# Patient Record
Sex: FEMALE | URBAN - METROPOLITAN AREA
[De-identification: names, ages, dates, MRNs, and addresses within clinical notes are randomized per-mention and may not be internally consistent; named-entity substitution may affect disease eponyms.]

---

## 2020-10-20 ENCOUNTER — APPOINTMENT (OUTPATIENT)
Dept: URBAN - METROPOLITAN AREA CLINIC 273 | Age: 75
Setting detail: DERMATOLOGY
End: 2020-10-20

## 2020-10-20 DIAGNOSIS — L30.9 DERMATITIS, UNSPECIFIED: ICD-10-CM

## 2020-10-20 DIAGNOSIS — L57.0 ACTINIC KERATOSIS: ICD-10-CM

## 2020-10-20 DIAGNOSIS — D485 NEOPLASM OF UNCERTAIN BEHAVIOR OF SKIN: ICD-10-CM

## 2020-10-20 PROBLEM — D48.5 NEOPLASM OF UNCERTAIN BEHAVIOR OF SKIN: Status: ACTIVE | Noted: 2020-10-20

## 2020-10-20 PROCEDURE — OTHER BIOPSY BY SHAVE METHOD: OTHER

## 2020-10-20 PROCEDURE — 99214 OFFICE O/P EST MOD 30 MIN: CPT | Mod: 25

## 2020-10-20 PROCEDURE — OTHER COUNSELING: OTHER

## 2020-10-20 PROCEDURE — 17000 DESTRUCT PREMALG LESION: CPT | Mod: 59

## 2020-10-20 PROCEDURE — OTHER PRESCRIPTION MEDICATION MANAGEMENT: OTHER

## 2020-10-20 PROCEDURE — OTHER SEPARATE AND IDENTIFIABLE DOCUMENTATION: OTHER

## 2020-10-20 PROCEDURE — OTHER LIQUID NITROGEN: OTHER

## 2020-10-20 PROCEDURE — OTHER REASSURANCE: OTHER

## 2020-10-20 PROCEDURE — 11102 TANGNTL BX SKIN SINGLE LES: CPT

## 2020-10-20 PROCEDURE — OTHER PRESCRIPTION: OTHER

## 2020-10-20 RX ORDER — MUPIROCIN 20 MG/G
OINTMENT TOPICAL
Qty: 1 | Refills: 1 | Status: ERX | COMMUNITY
Start: 2020-10-20

## 2020-10-20 RX ORDER — HYDROCORTISONE 25 MG/G
CREAM TOPICAL
Qty: 1 | Refills: 2 | Status: ERX | COMMUNITY
Start: 2020-10-20

## 2020-10-20 ASSESSMENT — LOCATION ZONE DERM
LOCATION ZONE: TRUNK
LOCATION ZONE: HAND
LOCATION ZONE: TRUNK
LOCATION ZONE: EAR

## 2020-10-20 ASSESSMENT — LOCATION DETAILED DESCRIPTION DERM
LOCATION DETAILED: LEFT LATERAL SUPERIOR CHEST
LOCATION DETAILED: LEFT LATERAL SUPERIOR CHEST
LOCATION DETAILED: LEFT ULNAR DORSAL HAND
LOCATION DETAILED: RIGHT ANTERIOR EARLOBE

## 2020-10-20 ASSESSMENT — LOCATION SIMPLE DESCRIPTION DERM
LOCATION SIMPLE: CHEST
LOCATION SIMPLE: CHEST
LOCATION SIMPLE: RIGHT EAR
LOCATION SIMPLE: LEFT HAND

## 2020-10-20 NOTE — PROCEDURE: BIOPSY BY SHAVE METHOD
Consent: Verbal consent was obtained and risks were reviewed including but not limited to scarring, infection, bleeding, scabbing, incomplete removal, nerve damage and allergy to anesthesia.
Information: Selecting Yes will display possible errors in your note based on the variables you have selected. This validation is only offered as a suggestion for you. PLEASE NOTE THAT THE VALIDATION TEXT WILL BE REMOVED WHEN YOU FINALIZE YOUR NOTE. IF YOU WANT TO FAX A PRELIMINARY NOTE YOU WILL NEED TO TOGGLE THIS TO 'NO' IF YOU DO NOT WANT IT IN YOUR FAXED NOTE.
Biopsy Type: H and E
Dressing: bandage
Curettage Text: The wound bed was treated with curettage after the biopsy was performed.
Validate Anticipated Plan: No
Post-Care Instructions: I reviewed with the patient in detail post-care instructions. Patient is to keep the biopsy site dry overnight, remove the bandage with the next shower/bath, clean with mild soap and water and apply a thin coat of Vaseline/Aquaphor. The patient may cover with a light bandage as needed. The patient was instructed to call the office should any bleeding occur that cannot be stopped with firm pressure for 20 minutes.
Cryotherapy Text: The wound bed was treated with cryotherapy after the biopsy was performed.
Notification Instructions: Patient will be notified of biopsy results. However, patient instructed to call the office if not contacted within 2 weeks.
Size Of Lesion In Cm: 0.4
Billing Type: Third-Party Bill
Anesthesia Type: 1% lidocaine with epinephrine
Electrodesiccation Text: The wound bed was treated with electrodesiccation after the biopsy was performed.
Path Notes (To The Dermatopathologist): Check margins
X Size Of Lesion In Cm: 0
Electrodesiccation And Curettage Text: The wound bed was treated with electrodesiccation and curettage after the biopsy was performed.
Wound Care: Petrolatum
Type Of Destruction Used: Curettage
Depth Of Biopsy: dermis
Render Post-Care Instructions In Note?: yes
Anesthesia Volume In Cc (Will Not Render If 0): 0.5
Biopsy Method: Dermablade
Hemostasis: Drysol
Silver Nitrate Text: The wound bed was treated with silver nitrate after the biopsy was performed.
Detail Level: Detailed

## 2020-10-20 NOTE — PROCEDURE: PRESCRIPTION MEDICATION MANAGEMENT
Initiate Treatment: Mupirocin ointment 2% and hydrocortisone 2.5%
Render In Strict Bullet Format?: No
Detail Level: Zone

## 2020-12-02 ENCOUNTER — APPOINTMENT (OUTPATIENT)
Dept: URBAN - METROPOLITAN AREA CLINIC 273 | Age: 75
Setting detail: DERMATOLOGY
End: 2020-12-07

## 2020-12-02 ENCOUNTER — RX ONLY (RX ONLY)
Age: 75
End: 2020-12-02

## 2020-12-02 PROBLEM — C44.629 SQUAMOUS CELL CARCINOMA OF SKIN OF LEFT UPPER LIMB, INCLUDING SHOULDER: Status: ACTIVE | Noted: 2020-12-02

## 2020-12-02 PROCEDURE — 17311 MOHS 1 STAGE H/N/HF/G: CPT

## 2020-12-02 PROCEDURE — 12042 INTMD RPR N-HF/GENIT2.6-7.5: CPT

## 2020-12-02 PROCEDURE — OTHER MOHS SURGERY: OTHER

## 2020-12-02 RX ORDER — CEPHALEXIN 500 MG/1
CAPSULE ORAL
Qty: 21 | Refills: 0 | Status: ERX | COMMUNITY
Start: 2020-12-02

## 2020-12-16 ENCOUNTER — APPOINTMENT (OUTPATIENT)
Dept: URBAN - METROPOLITAN AREA CLINIC 273 | Age: 75
Setting detail: DERMATOLOGY
End: 2020-12-16

## 2020-12-16 DIAGNOSIS — Z48.02 ENCOUNTER FOR REMOVAL OF SUTURES: ICD-10-CM

## 2020-12-16 PROCEDURE — OTHER ADDITIONAL NOTES: OTHER

## 2020-12-16 PROCEDURE — OTHER SUTURE REMOVAL (GLOBAL PERIOD): OTHER

## 2020-12-16 PROCEDURE — OTHER COUNSELING: OTHER

## 2020-12-16 ASSESSMENT — LOCATION SIMPLE DESCRIPTION DERM: LOCATION SIMPLE: LEFT HAND

## 2020-12-16 ASSESSMENT — LOCATION ZONE DERM: LOCATION ZONE: HAND

## 2020-12-16 ASSESSMENT — LOCATION DETAILED DESCRIPTION DERM: LOCATION DETAILED: LEFT RADIAL DORSAL HAND

## 2020-12-16 NOTE — PROCEDURE: ADDITIONAL NOTES
Additional Notes: Provider recommended topical abx, Mupuricin ointment, pt had some at home. Informed her to apply BiD
Detail Level: Simple

## 2020-12-16 NOTE — PROCEDURE: SUTURE REMOVAL (GLOBAL PERIOD)
Detail Level: Detailed
Add 21932 Cpt? (Important Note: In 2017 The Use Of 58340 Is Being Tracked By Cms To Determine Future Global Period Reimbursement For Global Periods): no

## 2020-12-28 ENCOUNTER — APPOINTMENT (OUTPATIENT)
Dept: URBAN - METROPOLITAN AREA CLINIC 273 | Age: 75
Setting detail: DERMATOLOGY
End: 2020-12-28

## 2020-12-28 DIAGNOSIS — D485 NEOPLASM OF UNCERTAIN BEHAVIOR OF SKIN: ICD-10-CM

## 2020-12-28 DIAGNOSIS — Z85.828 PERSONAL HISTORY OF OTHER MALIGNANT NEOPLASM OF SKIN: ICD-10-CM

## 2020-12-28 PROBLEM — D48.5 NEOPLASM OF UNCERTAIN BEHAVIOR OF SKIN: Status: ACTIVE | Noted: 2020-12-28

## 2020-12-28 PROCEDURE — OTHER COUNSELING: OTHER

## 2020-12-28 PROCEDURE — 11102 TANGNTL BX SKIN SINGLE LES: CPT

## 2020-12-28 PROCEDURE — OTHER BIOPSY BY SHAVE METHOD: OTHER

## 2020-12-28 PROCEDURE — 99213 OFFICE O/P EST LOW 20 MIN: CPT | Mod: 25

## 2020-12-28 ASSESSMENT — LOCATION DETAILED DESCRIPTION DERM: LOCATION DETAILED: LEFT PROXIMAL PRETIBIAL REGION

## 2020-12-28 ASSESSMENT — LOCATION ZONE DERM: LOCATION ZONE: LEG

## 2020-12-28 ASSESSMENT — LOCATION SIMPLE DESCRIPTION DERM: LOCATION SIMPLE: LEFT PRETIBIAL REGION

## 2020-12-28 NOTE — PROCEDURE: BIOPSY BY SHAVE METHOD
Type Of Destruction Used: Curettage
Was A Bandage Applied: Yes
X Size Of Lesion In Cm: 0
Wound Care: No ointment
Cryotherapy Text: The wound bed was treated with cryotherapy after the biopsy was performed.
Billing Type: Third-Party Bill
Validate That Anesthesia Is Not 0: No
Silver Nitrate Text: The wound bed was treated with silver nitrate after the biopsy was performed.
Electrodesiccation Text: The wound bed was treated with electrodesiccation after the biopsy was performed.
Consent: Written consent was obtained and risks were reviewed including but not limited to scarring, infection, bleeding, scabbing, incomplete removal, nerve damage and allergy to anesthesia.
Biopsy Method: Dermablade
Post-Care Instructions: I reviewed with the patient in detail post-care instructions. Patient is to keep the biopsy site dry overnight, and then apply bacitracin twice daily until healed. Patient may apply hydrogen peroxide soaks to remove any crusting.
Biopsy Type: H and E
Electrodesiccation And Curettage Text: The wound bed was treated with electrodesiccation and curettage after the biopsy was performed.
Notification Instructions: Patient will be notified of biopsy results. However, patient instructed to call the office if not contacted within 2 weeks.
Anesthesia Volume In Cc (Will Not Render If 0): 1
Depth Of Biopsy: dermis
Hemostasis: Aluminum Chloride
Anesthesia Type: 1% lidocaine with epinephrine
Detail Level: Simple
Curettage Text: The wound bed was treated with curettage after the biopsy was performed.
Dressing: bandage
Information: Selecting Yes will display possible errors in your note based on the variables you have selected. This validation is only offered as a suggestion for you. PLEASE NOTE THAT THE VALIDATION TEXT WILL BE REMOVED WHEN YOU FINALIZE YOUR NOTE. IF YOU WANT TO FAX A PRELIMINARY NOTE YOU WILL NEED TO TOGGLE THIS TO 'NO' IF YOU DO NOT WANT IT IN YOUR FAXED NOTE.

## 2021-01-13 ENCOUNTER — APPOINTMENT (OUTPATIENT)
Dept: URBAN - METROPOLITAN AREA CLINIC 273 | Age: 76
Setting detail: DERMATOLOGY
End: 2021-01-13

## 2021-01-13 PROBLEM — C44.729 SQUAMOUS CELL CARCINOMA OF SKIN OF LEFT LOWER LIMB, INCLUDING HIP: Status: ACTIVE | Noted: 2021-01-13

## 2021-01-13 PROCEDURE — OTHER SUPERFICIAL RADIATION TREATMENT: OTHER

## 2021-01-13 PROCEDURE — 77300 RADIATION THERAPY DOSE PLAN: CPT

## 2021-01-13 PROCEDURE — 77334 RADIATION TREATMENT AID(S): CPT

## 2021-01-13 PROCEDURE — OTHER TREATMENT REGIMEN: OTHER

## 2021-01-13 PROCEDURE — 99213 OFFICE O/P EST LOW 20 MIN: CPT | Mod: 25

## 2021-01-13 PROCEDURE — G6001 ECHO GUIDANCE RADIOTHERAPY: HCPCS

## 2021-01-13 PROCEDURE — 77280 THER RAD SIMULAJ FIELD SMPL: CPT

## 2021-01-13 PROCEDURE — 77261 THER RADIOLOGY TX PLNG SMPL: CPT

## 2021-01-13 NOTE — PROCEDURE: SUPERFICIAL RADIATION TREATMENT
Treatment Margins In Cm: 0.8
Treatment Time / Fractionation (Optional- Include Units): 0.33min
Bill For Radiation Treatment: No
Fractions / Week Rx 3: 5
Bill For Simulation And Treatment Device Design: Yes
Treatment Device Design After Initial Simulation Justification (Will Render If Bill For Treatment Devices = Yes): The patient is status post radiation simulation and is evaluated as to the use of additional devices for shielding and placement for radiation therapy.
Intro Statement (Will Not Render If Left Blank): The patient is undergoing superficial radiation therapy for skin cancer and presents for weekly evaluation and management.  Per protocol and as documented on the flow sheet, the patient was questioned as to subjective redness, pruritus, pain, drainage, fatigue, or any other symptoms.  Objectively, the radiation area was evaluated with regards to erythema, atrophy, scale, crusting, erosion, ulceration, edema, purpura, tenderness, warmth, drainage, and any other findings.  The plan was extensively reviewed including the dose, and dosing schedule.  The simulation and clinical setup was also reviewed as was the external and any internal shields and based on this review the appropriateness and sufficiency of treatment was determined.
Pathology Override (Pathology Will Render As Diagnosis Name If Left Blank): Squamous Cell Carcinoma-Invasive
Energy (Optional-Please Include Units): 70kV
Computed Treatment Time In Min (Will Render The Same As Calculated Treatment Time If Left Blank): 0.33
Field Size (Applicator): 4.0 cm
Information: Selecting Yes will display possible errors in your note based on the variables you have selected. This validation is only offered as a suggestion for you. PLEASE NOTE THAT THE VALIDATION TEXT WILL BE REMOVED WHEN YOU FINALIZE YOUR NOTE. IF YOU WANT TO FAX A PRELIMINARY NOTE YOU WILL NEED TO TOGGLE THIS TO 'NO' IF YOU DO NOT WANT IT IN YOUR FAXED NOTE.
Port Dimensions-X Axis In Cm: 3
Functional Status: 0 (fully active)
Additional Prescription Justification Text: If there is any interruption in treatment exceeding 5 days please see Decay and Dose Adjustment Calculation and complete treatment under Prescription 2, 3 or 4 as appropriate.
Detail Level: Detailed
Shielding Size (Optional- Include Units): 3.0x3.0
Field Number: 1
Dose Per Fractionation In Cgy (Optional): 230.01cGy
Total Number Of Fractions Rx 3: 15
Custom Shielding Preamble Text Will Not Be Included With Simple Simulations (.......... X X Y Cm): A lead shield of 0.762 mm thickness is utilized to form a molded, custom shield with a
Limb Positioning Or Elevation: Legs Extended
Dimensions-X Axis In Cm: 1.5
Simple Simulation Preamble Text Will Be Included With Simple Simulations (.......... Indications): Simple simulation was performed today for the following reasons:
Assessment: Appropriate reaction
Patient Positioning: Supine
Time Dose Fractionation (Optional- Include Units If Applicable): 93
Custom Shielding Afterword Text Will Not Be Included With Simple Simulations (X X Y Cm............): port to correlate with the lesion size, including treatment margin. The custom lead shield is adequate to accommodate the appropriate applicator and provide adequate shielding around the treatment site. Additional shielding (as noted below) is used to protect sensitive, normal tissues.
Total Number Of Fractions: 25
Treatment Time In Min (Optional): 1.06
Fractionation Number: 0
Depth (Optional-Please Include Units): 1.98
Energy (Include Units): 100kV
Simple Simulation Afterword Text Will Be Included With Simple Simulations (Indications............): The patient had a complete consultation regarding all applicable modalities for the treatment of their lesion and based on a variety of factors including the type of lesion, size, and location, the relevant medical history as well as local tissue factors, the functional status of the individual, the ability to perform necessary postoperative wound instructions and the need for simultaneous treatments as well as overall wound healing status, it was determined that the patient would begin radiation therapy treatment for skin cancer.  A full simulation and treatment device design was performed including the determination and formulation of appropriate simple and complex devices including lead shield of 2.286 mm thickness to form molded customized shielding to specifically correlate with the lesion size including treatment margin.  The custom lead shield is adequate to accommodate the appropriate applicator and provide adequate shielding around the treatment site.  The specific field applicator, shields, and devices both simple and complex as well as the specific patient setup is outlined below.  The patient was given a full consent for superficial radiation to both verbally and in writing and the full determination of patient's eligibility for treatment and selection is outlined on the patient eligibility and treatment selection form.  The specific superficial radiotherapy prescription was determined and was documented on the superficial radiotherapy prescription form.  A treatment calculation was also performed and documented on the treatment calculation form.  Based on the prescription, the patient was scheduled for a series of fractional treatments.
Initial Radiation Treatment Planning (Will Render If Bill Simulation = Yes): The patient had a complete consultation regarding all applicable modalities for the treatment of their skin cancer and based on a variety of factors including the type of lesion, size, and location, the relevant medical history as well as local tissue factors, the functional status of the individual, the ability to perform necessary postoperative wound instructions and the need for simultaneous treatments as well as overall wound healing status, it was determined that the patient would begin radiation therapy treatment for skin cancer.  A full simulation and treatment device design was performed including the determination and formulation of appropriate simple and complex devices including lead shield of 0.762 mm thickness to form molded customized shielding to specifically correlate with the lesion size including treatment margin.  The custom lead shield is adequate to accommodate the appropriate applicator and provide adequate shielding around the treatment site.  The specific field applicator, shields, and devices both simple and complex as well as the specific patient setup is outlined below.  The patient was given a full consent for superficial radiation to both verbally and in writing and the full determination of patient's eligibility for treatment and selection is outlined on the patient eligibility and treatment selection form.  The specific superficial radiotherapy prescription was determined and was documented on the superficial radiotherapy prescription form.  A treatment calculation was also performed and documented on the treatment calculation form.  Based on the prescription, the patient was scheduled for a series of fractional treatments.
Dose / Tx In Cgy (Optional): 596.78
Total Dose (Optional-Please Include Units): 5750.25cGy

## 2021-01-13 NOTE — PROCEDURE: TREATMENT REGIMEN
Plan: Per the request of Dr. Kristopher Finnegan, patient was seen today for Superficial Radiation Therapy requiring simulation in preparation for treatment of specific diseased site(s). Simulation is necessary to determine correct patient and treatment portal positioning, deliver safe and effective radiation therapy. \\n\\nA high frequency ultrasound image was acquired today for three-dimensional evaluation of tumor volume and response to treatment, in addition to geometric accuracy of field placement (CPT® ). Physician evaluation of the ultrasound tumor depth will be ongoing through the course of treatment and is deemed medically necessary ensuring efficacy of treatment. Ultrasound was used to confirm treatment location and determine depth of treatment. \\n\\Aureliano appropriate custom blocking and treatment parameters were verified by the Radiation Therapist. Today’s visit is for Simulation and planning for radiation therapy.  Questions were answered and concerns were addressed.  Patient was evaluated based on listed criteria and is a suitable candidate to begin SRT.   \\n\\nUS Depth: 1.98mm\\n\\nPer the request of Dr. Kristopher Finnegan, continuing medical physics review as per radiotherapy standard of care post every 5th fraction for patient, including assessment of treatment parameters,  of dose delivery, and review of patient treatment documentation in support of the provider, is ordered, ensuring efficacy and continued safe delivery of radiotherapy. Included in physics check is review of patient setup information, all pertinent simulation and treatment photographs checks, prescription, dose calculation verification, daily dose charted correctly, elapsed days and treatment days correctly charted, cumulative dose correct, and review of any prescription changes. Continued medical physics review post every 5th fraction of therapy is requested by provider for appropriate radiotherapy management, and is deemed medically necessary and standard of care.
Detail Level: Zone

## 2021-01-18 ENCOUNTER — APPOINTMENT (OUTPATIENT)
Dept: URBAN - METROPOLITAN AREA CLINIC 273 | Age: 76
Setting detail: DERMATOLOGY
End: 2021-01-18

## 2021-01-18 PROBLEM — C44.729 SQUAMOUS CELL CARCINOMA OF SKIN OF LEFT LOWER LIMB, INCLUDING HIP: Status: ACTIVE | Noted: 2021-01-18

## 2021-01-18 PROCEDURE — OTHER SUPERFICIAL RADIATION TREATMENT: OTHER

## 2021-01-18 PROCEDURE — OTHER TREATMENT REGIMEN: OTHER

## 2021-01-18 PROCEDURE — G6001 ECHO GUIDANCE RADIOTHERAPY: HCPCS

## 2021-01-18 PROCEDURE — 77280 THER RAD SIMULAJ FIELD SMPL: CPT

## 2021-01-18 PROCEDURE — 77401 RADIATION TX DELIVERY SUPFC: CPT

## 2021-01-18 NOTE — PROCEDURE: SUPERFICIAL RADIATION TREATMENT
Dose Per Fractionation In Cgy (Optional): 230.01cGy
Total Number Of Fractions Rx 2: 3
Bill For Treatment Devices Only: No
Fractionation Number (Evaluation): 5
Validate Note Data (See Information Below): Yes
Number Of Treatment Days: 1
Day Of The Week Treatment Administered: Monday
Initial Radiation Treatment Planning (Will Render If Bill Simulation = Yes): The patient had a complete consultation regarding all applicable modalities for the treatment of their skin cancer and based on a variety of factors including the type of lesion, size, and location, the relevant medical history as well as local tissue factors, the functional status of the individual, the ability to perform necessary postoperative wound instructions and the need for simultaneous treatments as well as overall wound healing status, it was determined that the patient would begin radiation therapy treatment for skin cancer.  A full simulation and treatment device design was performed including the determination and formulation of appropriate simple and complex devices including lead shield of 0.762 mm thickness to form molded customized shielding to specifically correlate with the lesion size including treatment margin.  The custom lead shield is adequate to accommodate the appropriate applicator and provide adequate shielding around the treatment site.  The specific field applicator, shields, and devices both simple and complex as well as the specific patient setup is outlined below.  The patient was given a full consent for superficial radiation to both verbally and in writing and the full determination of patient's eligibility for treatment and selection is outlined on the patient eligibility and treatment selection form.  The specific superficial radiotherapy prescription was determined and was documented on the superficial radiotherapy prescription form.  A treatment calculation was also performed and documented on the treatment calculation form.  Based on the prescription, the patient was scheduled for a series of fractional treatments.
Treatment Device Design After Initial Simulation Justification (Will Render If Bill For Treatment Devices = Yes): The patient is status post radiation simulation and is evaluated as to the use of additional devices for shielding and placement for radiation therapy.
Functional Status: 0 (fully active)
Limb Positioning Or Elevation: Legs Extended
Detail Level: Detailed
Time Dose Fractionation (Optional- Include Units If Applicable): 93
Intro Statement (Will Not Render If Left Blank): The patient is undergoing superficial radiation therapy for skin cancer and presents for weekly evaluation and management.  Per protocol and as documented on the flow sheet, the patient was questioned as to subjective redness, pruritus, pain, drainage, fatigue, or any other symptoms.  Objectively, the radiation area was evaluated with regards to erythema, atrophy, scale, crusting, erosion, ulceration, edema, purpura, tenderness, warmth, drainage, and any other findings.  The plan was extensively reviewed including the dose, and dosing schedule.  The simulation and clinical setup was also reviewed as was the external and any internal shields and based on this review the appropriateness and sufficiency of treatment was determined.
Assessment: Appropriate reaction
Simple Simulation Preamble Text Will Be Included With Simple Simulations (.......... Indications): Simple simulation was performed today for the following reasons:
Patient Positioning: Supine
Additional Prescription Justification Text: If there is any interruption in treatment exceeding 5 days please see Decay and Dose Adjustment Calculation and complete treatment under Prescription 2, 3 or 4 as appropriate.
Total Number Of Fractions: 25
Treatment Time In Min (Optional): 0.33
Shielding Size (Optional- Include Units): 3.0x3.0
Dimensions-X Axis In Cm: 1.5
Field Size (Applicator): 4.0 cm
Pathology Override (Pathology Will Render As Diagnosis Name If Left Blank): Squamous Cell Carcinoma-Invasive
Total Number Of Fractions Rx 4: 15
Energy (Include Units): 70kV
Total Dose (Optional-Please Include Units): 5750.25cGy
Dose / Tx In Cgy (Optional): 230.01
Custom Shielding Preamble Text Will Not Be Included With Simple Simulations (.......... X X Y Cm): A lead shield of 0.762 mm thickness is utilized to form a molded, custom shield with a
Treatment Time / Fractionation (Optional- Include Units): 0.33min
Depth (Optional-Please Include Units): 1.98
Treatment Margins In Cm: 0.8
Custom Shielding Afterword Text Will Not Be Included With Simple Simulations (X X Y Cm............): port to correlate with the lesion size, including treatment margin. The custom lead shield is adequate to accommodate the appropriate applicator and provide adequate shielding around the treatment site. Additional shielding (as noted below) is used to protect sensitive, normal tissues.
Simple Simulation Afterword Text Will Be Included With Simple Simulations (Indications............): The patient had a complete consultation regarding all applicable modalities for the treatment of their lesion and based on a variety of factors including the type of lesion, size, and location, the relevant medical history as well as local tissue factors, the functional status of the individual, the ability to perform necessary postoperative wound instructions and the need for simultaneous treatments as well as overall wound healing status, it was determined that the patient would begin radiation therapy treatment for skin cancer.  A full simulation and treatment device design was performed including the determination and formulation of appropriate simple and complex devices including lead shield of 2.286 mm thickness to form molded customized shielding to specifically correlate with the lesion size including treatment margin.  The custom lead shield is adequate to accommodate the appropriate applicator and provide adequate shielding around the treatment site.  The specific field applicator, shields, and devices both simple and complex as well as the specific patient setup is outlined below.  The patient was given a full consent for superficial radiation to both verbally and in writing and the full determination of patient's eligibility for treatment and selection is outlined on the patient eligibility and treatment selection form.  The specific superficial radiotherapy prescription was determined and was documented on the superficial radiotherapy prescription form.  A treatment calculation was also performed and documented on the treatment calculation form.  Based on the prescription, the patient was scheduled for a series of fractional treatments.
Information: Selecting Yes will display possible errors in your note based on the variables you have selected. This validation is only offered as a suggestion for you. PLEASE NOTE THAT THE VALIDATION TEXT WILL BE REMOVED WHEN YOU FINALIZE YOUR NOTE. IF YOU WANT TO FAX A PRELIMINARY NOTE YOU WILL NEED TO TOGGLE THIS TO 'NO' IF YOU DO NOT WANT IT IN YOUR FAXED NOTE.

## 2021-01-18 NOTE — PROCEDURE: TREATMENT REGIMEN
Detail Level: Zone
Plan: This patient has been treated today with image guided superficial radiation therapy for non-melanoma skin cancer.  Written informed consent has been previously obtained from this patient for this treatment. This consent is documented in the patient’s chart. The patient gave verbal consent to continue treatment today.\\nThe patient was treated with a specific radiation dose and setup as prescribed by the provider listed on this visit note. A Radiation Therapist performed administration of radiation under supervision of provider. The treatment parameters and cumulative dose are indicated above.\\nPrior to administering the radiation, the patient underwent a verification therapeutic radiology simulation-aided field setting defining relevant normal and abnormal target anatomy and acquiring images with high frequency ultrasound in addition to data necessary developing optimal radiation treatment process for the patient. This process includes verification of the treatment port(s) and proper treatment positioning. All treatment ports were photographed within electronic medical record. The patient’s customized lead blocking along with gross tumor volume and margin was confirmed. Considering superficial radiotherapy is clinical in setup, this requires physician and radiation therapist to clarify location interest being treated against initial images, pathology and patient anatomy. Care was taken ensuring fields treated were geometrically accurate and properly positioned using therapeutic radiology simulation-aided field setting verification per fraction. This process is also utilized to determine if any prescription or setup changes are necessary. These steps are therefore medically necessary ensuring safe and effective administration of radiation. Ongoing therapeutic radiology simulation-aided field setting verification is ordered throughout course of therapy.\\nA high frequency ultrasound image was acquired today for two-dimensional evaluation of the tumor volume and response to treatment, in addition to geometric accuracy of field placement. US depth is 2.12mm. The field placement and ultrasound imaging, per fraction, is separate and distinct from the initial simulation, and is an important task in providing safe administration of superficial radiation therapy. Physician evaluation of the ultrasound tumor depth will be ongoing throughout the course of treatment and is deemed medically necessary in order to ensure the efficacy of treatment and any necessary changes. Today’s image was evaluated for determination of continuation of treatment with the current plan or with necessary changes as appropriate. According to provider review of verification therapeutic radiology simulation-aided field setting and imaging, no change is required. Additionally, the use of ultrasound visualization and targeted assessment allows the patient to be able to see their cancer(s) progress, encouraging patient to complete and maintain compliance through full course of radiotherapy.\\nPer Dr. Kristopher Finnegan, continued ultrasound guidance and therapeutic radiology simulation-aided field setting verification per fraction is required for field placement, measurement of tumor depth, progress and acute effect monitoring.

## 2021-01-20 ENCOUNTER — APPOINTMENT (OUTPATIENT)
Dept: URBAN - METROPOLITAN AREA CLINIC 273 | Age: 76
Setting detail: DERMATOLOGY
End: 2021-01-20

## 2021-01-20 PROBLEM — C44.729 SQUAMOUS CELL CARCINOMA OF SKIN OF LEFT LOWER LIMB, INCLUDING HIP: Status: ACTIVE | Noted: 2021-01-20

## 2021-01-20 PROCEDURE — 77280 THER RAD SIMULAJ FIELD SMPL: CPT

## 2021-01-20 PROCEDURE — OTHER TREATMENT REGIMEN: OTHER

## 2021-01-20 PROCEDURE — OTHER SUPERFICIAL RADIATION TREATMENT: OTHER

## 2021-01-20 PROCEDURE — 77401 RADIATION TX DELIVERY SUPFC: CPT

## 2021-01-20 PROCEDURE — G6001 ECHO GUIDANCE RADIOTHERAPY: HCPCS

## 2021-01-20 NOTE — PROCEDURE: TREATMENT REGIMEN
Plan: This patient has been treated today with image guided superficial radiation therapy for non-melanoma skin cancer.  Written informed consent has been previously obtained from this patient for this treatment. This consent is documented in the patient’s chart. The patient gave verbal consent to continue treatment today.\\nThe patient was treated with a specific radiation dose and setup as prescribed by the provider listed on this visit note. A Radiation Therapist performed administration of radiation under supervision of provider. The treatment parameters and cumulative dose are indicated above.\\nPrior to administering the radiation, the patient underwent a verification therapeutic radiology simulation-aided field setting defining relevant normal and abnormal target anatomy and acquiring images with high frequency ultrasound in addition to data necessary developing optimal radiation treatment process for the patient. This process includes verification of the treatment port(s) and proper treatment positioning. All treatment ports were photographed within electronic medical record. The patient’s customized lead blocking along with gross tumor volume and margin was confirmed. Considering superficial radiotherapy is clinical in setup, this requires physician and radiation therapist to clarify location interest being treated against initial images, pathology and patient anatomy. Care was taken ensuring fields treated were geometrically accurate and properly positioned using therapeutic radiology simulation-aided field setting verification per fraction. This process is also utilized to determine if any prescription or setup changes are necessary. These steps are therefore medically necessary ensuring safe and effective administration of radiation. Ongoing therapeutic radiology simulation-aided field setting verification is ordered throughout course of therapy.\\nA high frequency ultrasound image was acquired today for two-dimensional evaluation of the tumor volume and response to treatment, in addition to geometric accuracy of field placement. US depth is 2.52mm, which is +0.40mm in difference from previous imaging. The field placement and ultrasound imaging, per fraction, is separate and distinct from the initial simulation, and is an important task in providing safe administration of superficial radiation therapy. Physician evaluation of the ultrasound tumor depth will be ongoing throughout the course of treatment and is deemed medically necessary in order to ensure the efficacy of treatment and any necessary changes. Today’s image was evaluated for determination of continuation of treatment with the current plan or with necessary changes as appropriate. According to provider review of verification therapeutic radiology simulation-aided field setting and imaging, no change is required. Additionally, the use of ultrasound visualization and targeted assessment allows the patient to be able to see their cancer(s) progress, encouraging patient to complete and maintain compliance through full course of radiotherapy.\\nPer Dr. Kristopher Finnegan, continued ultrasound guidance and therapeutic radiology simulation-aided field setting verification per fraction is required for field placement, measurement of tumor depth, progress and acute effect monitoring.
Detail Level: Zone

## 2021-01-21 ENCOUNTER — APPOINTMENT (OUTPATIENT)
Dept: URBAN - METROPOLITAN AREA CLINIC 273 | Age: 76
Setting detail: DERMATOLOGY
End: 2021-01-21

## 2021-01-21 PROBLEM — C44.729 SQUAMOUS CELL CARCINOMA OF SKIN OF LEFT LOWER LIMB, INCLUDING HIP: Status: ACTIVE | Noted: 2021-01-21

## 2021-01-21 PROCEDURE — 77280 THER RAD SIMULAJ FIELD SMPL: CPT

## 2021-01-21 PROCEDURE — G6001 ECHO GUIDANCE RADIOTHERAPY: HCPCS

## 2021-01-21 PROCEDURE — 77401 RADIATION TX DELIVERY SUPFC: CPT

## 2021-01-21 PROCEDURE — OTHER SUPERFICIAL RADIATION TREATMENT: OTHER

## 2021-01-21 PROCEDURE — OTHER TREATMENT REGIMEN: OTHER

## 2021-01-21 NOTE — PROCEDURE: SUPERFICIAL RADIATION TREATMENT
Field Size (Applicator): 4.0 cm
Prescription Used: 1
Energy (Optional-Please Include Units): 70kV
Include Rx 2 When Rendering Additional Prescriptions: No
Depth (Optional-Please Include Units): 1.98
Additional Prescription Justification Text: If there is any interruption in treatment exceeding 5 days please see Decay and Dose Adjustment Calculation and complete treatment under Prescription 2, 3 or 4 as appropriate.
Intro Statement (Will Not Render If Left Blank): The patient is undergoing superficial radiation therapy for skin cancer and presents for weekly evaluation and management.  Per protocol and as documented on the flow sheet, the patient was questioned as to subjective redness, pruritus, pain, drainage, fatigue, or any other symptoms.  Objectively, the radiation area was evaluated with regards to erythema, atrophy, scale, crusting, erosion, ulceration, edema, purpura, tenderness, warmth, drainage, and any other findings.  The plan was extensively reviewed including the dose, and dosing schedule.  The simulation and clinical setup was also reviewed as was the external and any internal shields and based on this review the appropriateness and sufficiency of treatment was determined.
Shielding Size (Optional- Include Units): 3.0x3.0
Include Rx 3 When Rendering Additional Prescriptions: Yes
Cumulative Dose In Cgy (Optional): 690.03
Daily Fractionated Dose (Optional- Include Units): 230.01cGy
Treatment Time In Min (Optional): 0.33
Total Number Of Fractions Rx 2: 3
Custom Shielding Preamble Text Will Not Be Included With Simple Simulations (.......... X X Y Cm): A lead shield of 0.762 mm thickness is utilized to form a molded, custom shield with a
Limb Positioning Or Elevation: Legs Extended
Total Number Of Fractions Rx 3: 15
Fractionation Number (Evaluation): 5
Treatment Margins In Cm: 0.8
Treatment Time / Fractionation (Optional- Include Units): 0.33min
Simple Simulation Preamble Text Will Be Included With Simple Simulations (.......... Indications): Simple simulation was performed today for the following reasons:
Day Of The Week Treatment Administered: Thursday
Time Dose Fractionation (Optional- Include Units If Applicable): 93
Custom Shielding Afterword Text Will Not Be Included With Simple Simulations (X X Y Cm............): port to correlate with the lesion size, including treatment margin. The custom lead shield is adequate to accommodate the appropriate applicator and provide adequate shielding around the treatment site. Additional shielding (as noted below) is used to protect sensitive, normal tissues.
Dimensions-X Axis In Cm: 1.5
Total Number Of Fractions: 25
Assessment: Appropriate reaction
Simple Simulation Afterword Text Will Be Included With Simple Simulations (Indications............): The patient had a complete consultation regarding all applicable modalities for the treatment of their lesion and based on a variety of factors including the type of lesion, size, and location, the relevant medical history as well as local tissue factors, the functional status of the individual, the ability to perform necessary postoperative wound instructions and the need for simultaneous treatments as well as overall wound healing status, it was determined that the patient would begin radiation therapy treatment for skin cancer.  A full simulation and treatment device design was performed including the determination and formulation of appropriate simple and complex devices including lead shield of 2.286 mm thickness to form molded customized shielding to specifically correlate with the lesion size including treatment margin.  The custom lead shield is adequate to accommodate the appropriate applicator and provide adequate shielding around the treatment site.  The specific field applicator, shields, and devices both simple and complex as well as the specific patient setup is outlined below.  The patient was given a full consent for superficial radiation to both verbally and in writing and the full determination of patient's eligibility for treatment and selection is outlined on the patient eligibility and treatment selection form.  The specific superficial radiotherapy prescription was determined and was documented on the superficial radiotherapy prescription form.  A treatment calculation was also performed and documented on the treatment calculation form.  Based on the prescription, the patient was scheduled for a series of fractional treatments.
Initial Radiation Treatment Planning (Will Render If Bill Simulation = Yes): The patient had a complete consultation regarding all applicable modalities for the treatment of their skin cancer and based on a variety of factors including the type of lesion, size, and location, the relevant medical history as well as local tissue factors, the functional status of the individual, the ability to perform necessary postoperative wound instructions and the need for simultaneous treatments as well as overall wound healing status, it was determined that the patient would begin radiation therapy treatment for skin cancer.  A full simulation and treatment device design was performed including the determination and formulation of appropriate simple and complex devices including lead shield of 0.762 mm thickness to form molded customized shielding to specifically correlate with the lesion size including treatment margin.  The custom lead shield is adequate to accommodate the appropriate applicator and provide adequate shielding around the treatment site.  The specific field applicator, shields, and devices both simple and complex as well as the specific patient setup is outlined below.  The patient was given a full consent for superficial radiation to both verbally and in writing and the full determination of patient's eligibility for treatment and selection is outlined on the patient eligibility and treatment selection form.  The specific superficial radiotherapy prescription was determined and was documented on the superficial radiotherapy prescription form.  A treatment calculation was also performed and documented on the treatment calculation form.  Based on the prescription, the patient was scheduled for a series of fractional treatments.
Functional Status: 0 (fully active)
Information: Selecting Yes will display possible errors in your note based on the variables you have selected. This validation is only offered as a suggestion for you. PLEASE NOTE THAT THE VALIDATION TEXT WILL BE REMOVED WHEN YOU FINALIZE YOUR NOTE. IF YOU WANT TO FAX A PRELIMINARY NOTE YOU WILL NEED TO TOGGLE THIS TO 'NO' IF YOU DO NOT WANT IT IN YOUR FAXED NOTE.
Treatment Device Design After Initial Simulation Justification (Will Render If Bill For Treatment Devices = Yes): The patient is status post radiation simulation and is evaluated as to the use of additional devices for shielding and placement for radiation therapy.
Detail Level: Detailed
Total Dose (Optional-Please Include Units): 5750.25cGy
Pathology Override (Pathology Will Render As Diagnosis Name If Left Blank): Squamous Cell Carcinoma-Invasive
Dose / Tx In Cgy (Optional): 230.01
Patient Positioning: Supine

## 2021-01-21 NOTE — PROCEDURE: TREATMENT REGIMEN
Plan: This patient has been treated today with image guided superficial radiation therapy for non-melanoma skin cancer.  Written informed consent has been previously obtained from this patient for this treatment. This consent is documented in the patient’s chart. The patient gave verbal consent to continue treatment today.\\nThe patient was treated with a specific radiation dose and setup as prescribed by the provider listed on this visit note. A Radiation Therapist performed administration of radiation under supervision of provider. The treatment parameters and cumulative dose are indicated above.\\nPrior to administering the radiation, the patient underwent a verification therapeutic radiology simulation-aided field setting defining relevant normal and abnormal target anatomy and acquiring images with high frequency ultrasound in addition to data necessary developing optimal radiation treatment process for the patient. This process includes verification of the treatment port(s) and proper treatment positioning. All treatment ports were photographed within electronic medical record. The patient’s customized lead blocking along with gross tumor volume and margin was confirmed. Considering superficial radiotherapy is clinical in setup, this requires physician and radiation therapist to clarify location interest being treated against initial images, pathology and patient anatomy. Care was taken ensuring fields treated were geometrically accurate and properly positioned using therapeutic radiology simulation-aided field setting verification per fraction. This process is also utilized to determine if any prescription or setup changes are necessary. These steps are therefore medically necessary ensuring safe and effective administration of radiation. Ongoing therapeutic radiology simulation-aided field setting verification is ordered throughout course of therapy.\\nA high frequency ultrasound image was acquired today for two-dimensional evaluation of the tumor volume and response to treatment, in addition to geometric accuracy of field placement. US depth is 2.05mm, which is -0.47mm in difference from previous imaging. The field placement and ultrasound imaging, per fraction, is separate and distinct from the initial simulation, and is an important task in providing safe administration of superficial radiation therapy. Physician evaluation of the ultrasound tumor depth will be ongoing throughout the course of treatment and is deemed medically necessary in order to ensure the efficacy of treatment and any necessary changes. Today’s image was evaluated for determination of continuation of treatment with the current plan or with necessary changes as appropriate. According to provider review of verification therapeutic radiology simulation-aided field setting and imaging, no change is required. Additionally, the use of ultrasound visualization and targeted assessment allows the patient to be able to see their cancer(s) progress, encouraging patient to complete and maintain compliance through full course of radiotherapy.\\nPer Dr. Kristopher Finnegan, continued ultrasound guidance and therapeutic radiology simulation-aided field setting verification per fraction is required for field placement, measurement of tumor depth, progress and acute effect monitoring.
Detail Level: Zone

## 2021-01-25 ENCOUNTER — APPOINTMENT (OUTPATIENT)
Dept: URBAN - METROPOLITAN AREA CLINIC 273 | Age: 76
Setting detail: DERMATOLOGY
End: 2021-01-25

## 2021-01-25 PROBLEM — C44.729 SQUAMOUS CELL CARCINOMA OF SKIN OF LEFT LOWER LIMB, INCLUDING HIP: Status: ACTIVE | Noted: 2021-01-25

## 2021-01-25 PROCEDURE — 77280 THER RAD SIMULAJ FIELD SMPL: CPT

## 2021-01-25 PROCEDURE — G6001 ECHO GUIDANCE RADIOTHERAPY: HCPCS

## 2021-01-25 PROCEDURE — OTHER TREATMENT REGIMEN: OTHER

## 2021-01-25 PROCEDURE — 77401 RADIATION TX DELIVERY SUPFC: CPT

## 2021-01-25 PROCEDURE — OTHER SUPERFICIAL RADIATION TREATMENT: OTHER

## 2021-01-25 NOTE — PROCEDURE: TREATMENT REGIMEN
Plan: This patient has been treated today with image guided superficial radiation therapy for non-melanoma skin cancer.  Written informed consent has been previously obtained from this patient for this treatment. This consent is documented in the patient’s chart. The patient gave verbal consent to continue treatment today.\\nThe patient was treated with a specific radiation dose and setup as prescribed by the provider listed on this visit note. A Radiation Therapist performed administration of radiation under supervision of provider. The treatment parameters and cumulative dose are indicated above.\\nPrior to administering the radiation, the patient underwent a verification therapeutic radiology simulation-aided field setting defining relevant normal and abnormal target anatomy and acquiring images with high frequency ultrasound in addition to data necessary developing optimal radiation treatment process for the patient. This process includes verification of the treatment port(s) and proper treatment positioning. All treatment ports were photographed within electronic medical record. The patient’s customized lead blocking along with gross tumor volume and margin was confirmed. Considering superficial radiotherapy is clinical in setup, this requires physician and radiation therapist to clarify location interest being treated against initial images, pathology and patient anatomy. Care was taken ensuring fields treated were geometrically accurate and properly positioned using therapeutic radiology simulation-aided field setting verification per fraction. This process is also utilized to determine if any prescription or setup changes are necessary. These steps are therefore medically necessary ensuring safe and effective administration of radiation. Ongoing therapeutic radiology simulation-aided field setting verification is ordered throughout course of therapy.\\nA high frequency ultrasound image was acquired today for two-dimensional evaluation of the tumor volume and response to treatment, in addition to geometric accuracy of field placement. US depth is 1.82mm, which is -0.23mm in difference from previous imaging. The field placement and ultrasound imaging, per fraction, is separate and distinct from the initial simulation, and is an important task in providing safe administration of superficial radiation therapy. Physician evaluation of the ultrasound tumor depth will be ongoing throughout the course of treatment and is deemed medically necessary in order to ensure the efficacy of treatment and any necessary changes. Today’s image was evaluated for determination of continuation of treatment with the current plan or with necessary changes as appropriate. According to provider review of verification therapeutic radiology simulation-aided field setting and imaging, no change is required. Additionally, the use of ultrasound visualization and targeted assessment allows the patient to be able to see their cancer(s) progress, encouraging patient to complete and maintain compliance through full course of radiotherapy.\\nPer Dr. Kristopher Finnegan, continued ultrasound guidance and therapeutic radiology simulation-aided field setting verification per fraction is required for field placement, measurement of tumor depth, progress and acute effect monitoring.
Detail Level: Zone

## 2021-01-25 NOTE — PROCEDURE: SUPERFICIAL RADIATION TREATMENT
Time Dose Fractionation (Optional- Include Units If Applicable): 93
Assessment: Appropriate reaction
Patient Positioning: Supine
Bill For Treatment Devices Only: No
Additional Prescription Justification Text: If there is any interruption in treatment exceeding 5 days please see Decay and Dose Adjustment Calculation and complete treatment under Prescription 2, 3 or 4 as appropriate.
Total Number Of Fractions: 25
Shielding Size (Optional- Include Units): 3.0x3.0
Port Dimensions-X Axis In Cm: 3
Include Rx 4 When Rendering Additional Prescriptions: Yes
Functional Status: 0 (fully active)
Detail Level: Detailed
Initial Radiation Treatment Planning (Will Render If Bill Simulation = Yes): The patient had a complete consultation regarding all applicable modalities for the treatment of their skin cancer and based on a variety of factors including the type of lesion, size, and location, the relevant medical history as well as local tissue factors, the functional status of the individual, the ability to perform necessary postoperative wound instructions and the need for simultaneous treatments as well as overall wound healing status, it was determined that the patient would begin radiation therapy treatment for skin cancer.  A full simulation and treatment device design was performed including the determination and formulation of appropriate simple and complex devices including lead shield of 0.762 mm thickness to form molded customized shielding to specifically correlate with the lesion size including treatment margin.  The custom lead shield is adequate to accommodate the appropriate applicator and provide adequate shielding around the treatment site.  The specific field applicator, shields, and devices both simple and complex as well as the specific patient setup is outlined below.  The patient was given a full consent for superficial radiation to both verbally and in writing and the full determination of patient's eligibility for treatment and selection is outlined on the patient eligibility and treatment selection form.  The specific superficial radiotherapy prescription was determined and was documented on the superficial radiotherapy prescription form.  A treatment calculation was also performed and documented on the treatment calculation form.  Based on the prescription, the patient was scheduled for a series of fractional treatments.
Treatment Time In Min (Optional): 0.33
Field Number: 1
Fractionation Number: 4
Field Size (Applicator): 4.0 cm
Custom Shielding Preamble Text Will Not Be Included With Simple Simulations (.......... X X Y Cm): A lead shield of 0.762 mm thickness is utilized to form a molded, custom shield with a
Energy (Include Units): 70kV
Total Number Of Fractions Rx 4: 15
Total Dose (Optional-Please Include Units): 5750.25cGy
Dimensions-X Axis In Cm: 1.5
Simple Simulation Preamble Text Will Be Included With Simple Simulations (.......... Indications): Simple simulation was performed today for the following reasons:
Dose / Tx In Cgy (Optional): 230.01
Intro Statement (Will Not Render If Left Blank): The patient is undergoing superficial radiation therapy for skin cancer and presents for weekly evaluation and management.  Per protocol and as documented on the flow sheet, the patient was questioned as to subjective redness, pruritus, pain, drainage, fatigue, or any other symptoms.  Objectively, the radiation area was evaluated with regards to erythema, atrophy, scale, crusting, erosion, ulceration, edema, purpura, tenderness, warmth, drainage, and any other findings.  The plan was extensively reviewed including the dose, and dosing schedule.  The simulation and clinical setup was also reviewed as was the external and any internal shields and based on this review the appropriateness and sufficiency of treatment was determined.
Fractions / Week Rx 4: 5
Information: Selecting Yes will display possible errors in your note based on the variables you have selected. This validation is only offered as a suggestion for you. PLEASE NOTE THAT THE VALIDATION TEXT WILL BE REMOVED WHEN YOU FINALIZE YOUR NOTE. IF YOU WANT TO FAX A PRELIMINARY NOTE YOU WILL NEED TO TOGGLE THIS TO 'NO' IF YOU DO NOT WANT IT IN YOUR FAXED NOTE.
Pathology Override (Pathology Will Render As Diagnosis Name If Left Blank): Squamous Cell Carcinoma-Invasive
Custom Shielding Afterword Text Will Not Be Included With Simple Simulations (X X Y Cm............): port to correlate with the lesion size, including treatment margin. The custom lead shield is adequate to accommodate the appropriate applicator and provide adequate shielding around the treatment site. Additional shielding (as noted below) is used to protect sensitive, normal tissues.
Depth (Optional-Please Include Units): 1.98
Simple Simulation Afterword Text Will Be Included With Simple Simulations (Indications............): The patient had a complete consultation regarding all applicable modalities for the treatment of their lesion and based on a variety of factors including the type of lesion, size, and location, the relevant medical history as well as local tissue factors, the functional status of the individual, the ability to perform necessary postoperative wound instructions and the need for simultaneous treatments as well as overall wound healing status, it was determined that the patient would begin radiation therapy treatment for skin cancer.  A full simulation and treatment device design was performed including the determination and formulation of appropriate simple and complex devices including lead shield of 2.286 mm thickness to form molded customized shielding to specifically correlate with the lesion size including treatment margin.  The custom lead shield is adequate to accommodate the appropriate applicator and provide adequate shielding around the treatment site.  The specific field applicator, shields, and devices both simple and complex as well as the specific patient setup is outlined below.  The patient was given a full consent for superficial radiation to both verbally and in writing and the full determination of patient's eligibility for treatment and selection is outlined on the patient eligibility and treatment selection form.  The specific superficial radiotherapy prescription was determined and was documented on the superficial radiotherapy prescription form.  A treatment calculation was also performed and documented on the treatment calculation form.  Based on the prescription, the patient was scheduled for a series of fractional treatments.
Cumulative Dose In Cgy (Optional): 920.04
Daily Fractionated Dose (Optional- Include Units): 230.01cGy
Limb Positioning Or Elevation: Legs Extended
Treatment Margins In Cm: 0.8
Treatment Device Design After Initial Simulation Justification (Will Render If Bill For Treatment Devices = Yes): The patient is status post radiation simulation and is evaluated as to the use of additional devices for shielding and placement for radiation therapy.
Treatment Time / Fractionation (Optional- Include Units): 0.33min
Day Of The Week Treatment Administered: Monday

## 2021-01-26 ENCOUNTER — APPOINTMENT (OUTPATIENT)
Dept: URBAN - METROPOLITAN AREA CLINIC 273 | Age: 76
Setting detail: DERMATOLOGY
End: 2021-01-26

## 2021-01-26 PROBLEM — C44.729 SQUAMOUS CELL CARCINOMA OF SKIN OF LEFT LOWER LIMB, INCLUDING HIP: Status: ACTIVE | Noted: 2021-01-26

## 2021-01-26 PROCEDURE — OTHER TREATMENT REGIMEN: OTHER

## 2021-01-26 PROCEDURE — G6001 ECHO GUIDANCE RADIOTHERAPY: HCPCS

## 2021-01-26 PROCEDURE — OTHER SUPERFICIAL RADIATION TREATMENT: OTHER

## 2021-01-26 PROCEDURE — 77427 RADIATION TX MANAGEMENT X5: CPT

## 2021-01-26 PROCEDURE — 77280 THER RAD SIMULAJ FIELD SMPL: CPT

## 2021-01-26 PROCEDURE — 77401 RADIATION TX DELIVERY SUPFC: CPT

## 2021-01-26 NOTE — PROCEDURE: SUPERFICIAL RADIATION TREATMENT
Fractionation Number: 5
Bill For Dosimetry/Render Decay And Dose Adjustment Calculation In Note: No
Field Size (Applicator): 4.0 cm
Custom Shielding Preamble Text Will Not Be Included With Simple Simulations (.......... X X Y Cm): A lead shield of 0.762 mm thickness is utilized to form a molded, custom shield with a
Energy (Optional-Please Include Units): 70kV
Total Dose (Optional-Please Include Units): 5750.25cGy
Port Dimensions-Y Axis In Cm: 3
Information: Selecting Yes will display possible errors in your note based on the variables you have selected. This validation is only offered as a suggestion for you. PLEASE NOTE THAT THE VALIDATION TEXT WILL BE REMOVED WHEN YOU FINALIZE YOUR NOTE. IF YOU WANT TO FAX A PRELIMINARY NOTE YOU WILL NEED TO TOGGLE THIS TO 'NO' IF YOU DO NOT WANT IT IN YOUR FAXED NOTE.
Simple Simulation Preamble Text Will Be Included With Simple Simulations (.......... Indications): Simple simulation was performed today for the following reasons:
Comments: RTOG 1
Dose / Tx In Cgy (Optional): 230.01
Time Dose Fractionation (Optional- Include Units If Applicable): 93
Validate Note Data (See Information Below): Yes
Custom Shielding Afterword Text Will Not Be Included With Simple Simulations (X X Y Cm............): port to correlate with the lesion size, including treatment margin. The custom lead shield is adequate to accommodate the appropriate applicator and provide adequate shielding around the treatment site. Additional shielding (as noted below) is used to protect sensitive, normal tissues.
Depth (Optional-Please Include Units): 1.98
Intro Statement (Will Not Render If Left Blank): The patient is undergoing superficial radiation therapy for skin cancer and presents for weekly evaluation and management.  Per protocol and as documented on the flow sheet, the patient was questioned as to subjective redness, pruritus, pain, drainage, fatigue, or any other symptoms.  Objectively, the radiation area was evaluated with regards to erythema, atrophy, scale, crusting, erosion, ulceration, edema, purpura, tenderness, warmth, drainage, and any other findings.  The plan was extensively reviewed including the dose, and dosing schedule.  The simulation and clinical setup was also reviewed as was the external and any internal shields and based on this review the appropriateness and sufficiency of treatment was determined.
Pathology Override (Pathology Will Render As Diagnosis Name If Left Blank): Squamous Cell Carcinoma-Invasive
Total Number Of Fractions Rx 3: 15
Simple Simulation Afterword Text Will Be Included With Simple Simulations (Indications............): The patient had a complete consultation regarding all applicable modalities for the treatment of their lesion and based on a variety of factors including the type of lesion, size, and location, the relevant medical history as well as local tissue factors, the functional status of the individual, the ability to perform necessary postoperative wound instructions and the need for simultaneous treatments as well as overall wound healing status, it was determined that the patient would begin radiation therapy treatment for skin cancer.  A full simulation and treatment device design was performed including the determination and formulation of appropriate simple and complex devices including lead shield of 2.286 mm thickness to form molded customized shielding to specifically correlate with the lesion size including treatment margin.  The custom lead shield is adequate to accommodate the appropriate applicator and provide adequate shielding around the treatment site.  The specific field applicator, shields, and devices both simple and complex as well as the specific patient setup is outlined below.  The patient was given a full consent for superficial radiation to both verbally and in writing and the full determination of patient's eligibility for treatment and selection is outlined on the patient eligibility and treatment selection form.  The specific superficial radiotherapy prescription was determined and was documented on the superficial radiotherapy prescription form.  A treatment calculation was also performed and documented on the treatment calculation form.  Based on the prescription, the patient was scheduled for a series of fractional treatments.
Cumulative Dose In Cgy (Optional): 1150.05
Daily Fractionated Dose (Optional- Include Units): 230.01cGy
Number Of Treatment Days: 1
Dimensions-Y Axis In Cm: 1.5
Computed Treatment Time In Min (Will Render The Same As Calculated Treatment Time If Left Blank): 0.33
Detail Level: Detailed
Limb Positioning Or Elevation: Legs Extended
Treatment Device Design After Initial Simulation Justification (Will Render If Bill For Treatment Devices = Yes): The patient is status post radiation simulation and is evaluated as to the use of additional devices for shielding and placement for radiation therapy.
Treatment Time / Fractionation (Optional- Include Units): 0.33min
Treatment Margins In Cm: 0.8
Day Of The Week Treatment Administered: Tuesday
Patient Positioning: Supine
Additional Prescription Justification Text: If there is any interruption in treatment exceeding 5 days please see Decay and Dose Adjustment Calculation and complete treatment under Prescription 2, 3 or 4 as appropriate.
Total Number Of Fractions: 25
Shielding Size (Optional- Include Units): 3.0x3.0
Functional Status: 0 (fully active)
Initial Radiation Treatment Planning (Will Render If Bill Simulation = Yes): The patient had a complete consultation regarding all applicable modalities for the treatment of their skin cancer and based on a variety of factors including the type of lesion, size, and location, the relevant medical history as well as local tissue factors, the functional status of the individual, the ability to perform necessary postoperative wound instructions and the need for simultaneous treatments as well as overall wound healing status, it was determined that the patient would begin radiation therapy treatment for skin cancer.  A full simulation and treatment device design was performed including the determination and formulation of appropriate simple and complex devices including lead shield of 0.762 mm thickness to form molded customized shielding to specifically correlate with the lesion size including treatment margin.  The custom lead shield is adequate to accommodate the appropriate applicator and provide adequate shielding around the treatment site.  The specific field applicator, shields, and devices both simple and complex as well as the specific patient setup is outlined below.  The patient was given a full consent for superficial radiation to both verbally and in writing and the full determination of patient's eligibility for treatment and selection is outlined on the patient eligibility and treatment selection form.  The specific superficial radiotherapy prescription was determined and was documented on the superficial radiotherapy prescription form.  A treatment calculation was also performed and documented on the treatment calculation form.  Based on the prescription, the patient was scheduled for a series of fractional treatments.
Assessment: Appropriate reaction

## 2021-01-26 NOTE — PROCEDURE: TREATMENT REGIMEN
Plan: This patient has been treated today with image guided superficial radiation therapy for non-melanoma skin cancer.  Written informed consent has been previously obtained from this patient for this treatment. This consent is documented in the patient’s chart. The patient gave verbal consent to continue treatment today.\\nThe patient was treated with a specific radiation dose and setup as prescribed by the provider listed on this visit note. A Radiation Therapist performed administration of radiation under supervision of provider. The treatment parameters and cumulative dose are indicated above.\\nPrior to administering the radiation, the patient underwent a verification therapeutic radiology simulation-aided field setting defining relevant normal and abnormal target anatomy and acquiring images with high frequency ultrasound in addition to data necessary developing optimal radiation treatment process for the patient. This process includes verification of the treatment port(s) and proper treatment positioning. All treatment ports were photographed within electronic medical record. The patient’s customized lead blocking along with gross tumor volume and margin was confirmed. Considering superficial radiotherapy is clinical in setup, this requires physician and radiation therapist to clarify location interest being treated against initial images, pathology and patient anatomy. Care was taken ensuring fields treated were geometrically accurate and properly positioned using therapeutic radiology simulation-aided field setting verification per fraction. This process is also utilized to determine if any prescription or setup changes are necessary. These steps are therefore medically necessary ensuring safe and effective administration of radiation. Ongoing therapeutic radiology simulation-aided field setting verification is ordered throughout course of therapy.\\nA high frequency ultrasound image was acquired today for two-dimensional evaluation of the tumor volume and response to treatment, in addition to geometric accuracy of field placement. US depth is 2.00mm, which is +0.18mm in difference from previous imaging. The field placement and ultrasound imaging, per fraction, is separate and distinct from the initial simulation, and is an important task in providing safe administration of superficial radiation therapy. Physician evaluation of the ultrasound tumor depth will be ongoing throughout the course of treatment and is deemed medically necessary in order to ensure the efficacy of treatment and any necessary changes. Today’s image was evaluated for determination of continuation of treatment with the current plan or with necessary changes as appropriate. According to provider review of verification therapeutic radiology simulation-aided field setting and imaging, no change is required. Additionally, the use of ultrasound visualization and targeted assessment allows the patient to be able to see their cancer(s) progress, encouraging patient to complete and maintain compliance through full course of radiotherapy.\\nPer Dr. Kristopher Finnegan, continued ultrasound guidance and therapeutic radiology simulation-aided field setting verification per fraction is required for field placement, measurement of tumor depth, progress and acute effect monitoring.
Detail Level: Zone

## 2021-01-28 ENCOUNTER — APPOINTMENT (OUTPATIENT)
Dept: URBAN - METROPOLITAN AREA CLINIC 273 | Age: 76
Setting detail: DERMATOLOGY
End: 2021-01-28

## 2021-01-28 PROBLEM — C44.729 SQUAMOUS CELL CARCINOMA OF SKIN OF LEFT LOWER LIMB, INCLUDING HIP: Status: ACTIVE | Noted: 2021-01-28

## 2021-01-28 PROCEDURE — 77401 RADIATION TX DELIVERY SUPFC: CPT

## 2021-01-28 PROCEDURE — OTHER SUPERFICIAL RADIATION TREATMENT: OTHER

## 2021-01-28 PROCEDURE — OTHER TREATMENT REGIMEN: OTHER

## 2021-01-28 PROCEDURE — G6001 ECHO GUIDANCE RADIOTHERAPY: HCPCS

## 2021-01-28 PROCEDURE — 77280 THER RAD SIMULAJ FIELD SMPL: CPT

## 2021-01-28 NOTE — PROCEDURE: TREATMENT REGIMEN
Plan: This patient has been treated today with image guided superficial radiation therapy for non-melanoma skin cancer.  Written informed consent has been previously obtained from this patient for this treatment. This consent is documented in the patient’s chart. The patient gave verbal consent to continue treatment today.\\nThe patient was treated with a specific radiation dose and setup as prescribed by the provider listed on this visit note. A Radiation Therapist performed administration of radiation under supervision of provider. The treatment parameters and cumulative dose are indicated above.\\nPrior to administering the radiation, the patient underwent a verification therapeutic radiology simulation-aided field setting defining relevant normal and abnormal target anatomy and acquiring images with high frequency ultrasound in addition to data necessary developing optimal radiation treatment process for the patient. This process includes verification of the treatment port(s) and proper treatment positioning. All treatment ports were photographed within electronic medical record. The patient’s customized lead blocking along with gross tumor volume and margin was confirmed. Considering superficial radiotherapy is clinical in setup, this requires physician and radiation therapist to clarify location interest being treated against initial images, pathology and patient anatomy. Care was taken ensuring fields treated were geometrically accurate and properly positioned using therapeutic radiology simulation-aided field setting verification per fraction. This process is also utilized to determine if any prescription or setup changes are necessary. These steps are therefore medically necessary ensuring safe and effective administration of radiation. Ongoing therapeutic radiology simulation-aided field setting verification is ordered throughout course of therapy.\\nA high frequency ultrasound image was acquired today for two-dimensional evaluation of the tumor volume and response to treatment, in addition to geometric accuracy of field placement. US depth is 1.58mm, which is -0.42mm in difference from previous imaging. The field placement and ultrasound imaging, per fraction, is separate and distinct from the initial simulation, and is an important task in providing safe administration of superficial radiation therapy. Physician evaluation of the ultrasound tumor depth will be ongoing throughout the course of treatment and is deemed medically necessary in order to ensure the efficacy of treatment and any necessary changes. Today’s image was evaluated for determination of continuation of treatment with the current plan or with necessary changes as appropriate. According to provider review of verification therapeutic radiology simulation-aided field setting and imaging, no change is required. Additionally, the use of ultrasound visualization and targeted assessment allows the patient to be able to see their cancer(s) progress, encouraging patient to complete and maintain compliance through full course of radiotherapy.\\nPer Dr. Kristopher Finnegan, continued ultrasound guidance and therapeutic radiology simulation-aided field setting verification per fraction is required for field placement, measurement of tumor depth, progress and acute effect monitoring.
Detail Level: Zone

## 2021-01-28 NOTE — PROCEDURE: SUPERFICIAL RADIATION TREATMENT
Include Rx 4 When Rendering Additional Prescriptions: Yes
Energy (Include Units): 70kV
Simple Simulation Preamble Text Will Be Included With Simple Simulations (.......... Indications): Simple simulation was performed today for the following reasons:
Time Dose Fractionation (Optional- Include Units If Applicable): 93
Fractionation Number: 6
Pathology Override (Pathology Will Render As Diagnosis Name If Left Blank): Squamous Cell Carcinoma-Invasive
Dose Per Fractionation In Cgy (Optional): 230.01cGy
Bill For Treatment Devices Only: No
Field Size (Applicator): 4.0 cm
Additional Prescription Justification Text: If there is any interruption in treatment exceeding 5 days please see Decay and Dose Adjustment Calculation and complete treatment under Prescription 2, 3 or 4 as appropriate.
Dose / Tx In Cgy (Optional): 230.01
Port Dimensions-X Axis In Cm: 3
Total Number Of Fractions: 25
Shielding Size (Optional- Include Units): 3.0x3.0
Dimensions-Y Axis In Cm: 1.5
Day Of The Week Treatment Administered: Thursday
Prescription Used: 1
Comments: RTOG 1
Fractions / Week Rx 4: 5
Custom Shielding Preamble Text Will Not Be Included With Simple Simulations (.......... X X Y Cm): A lead shield of 0.762 mm thickness is utilized to form a molded, custom shield with a
Limb Positioning Or Elevation: Legs Extended
Cumulative Dose In Cgy (Optional): 1380.06
Total Dose (Optional-Please Include Units): 5750.25cGy
Treatment Margins In Cm: 0.8
Assessment: Appropriate reaction
Information: Selecting Yes will display possible errors in your note based on the variables you have selected. This validation is only offered as a suggestion for you. PLEASE NOTE THAT THE VALIDATION TEXT WILL BE REMOVED WHEN YOU FINALIZE YOUR NOTE. IF YOU WANT TO FAX A PRELIMINARY NOTE YOU WILL NEED TO TOGGLE THIS TO 'NO' IF YOU DO NOT WANT IT IN YOUR FAXED NOTE.
Patient Positioning: Supine
Total Number Of Fractions Rx 3: 15
Custom Shielding Afterword Text Will Not Be Included With Simple Simulations (X X Y Cm............): port to correlate with the lesion size, including treatment margin. The custom lead shield is adequate to accommodate the appropriate applicator and provide adequate shielding around the treatment site. Additional shielding (as noted below) is used to protect sensitive, normal tissues.
Depth (Optional-Please Include Units): 1.98
Simple Simulation Afterword Text Will Be Included With Simple Simulations (Indications............): The patient had a complete consultation regarding all applicable modalities for the treatment of their lesion and based on a variety of factors including the type of lesion, size, and location, the relevant medical history as well as local tissue factors, the functional status of the individual, the ability to perform necessary postoperative wound instructions and the need for simultaneous treatments as well as overall wound healing status, it was determined that the patient would begin radiation therapy treatment for skin cancer.  A full simulation and treatment device design was performed including the determination and formulation of appropriate simple and complex devices including lead shield of 2.286 mm thickness to form molded customized shielding to specifically correlate with the lesion size including treatment margin.  The custom lead shield is adequate to accommodate the appropriate applicator and provide adequate shielding around the treatment site.  The specific field applicator, shields, and devices both simple and complex as well as the specific patient setup is outlined below.  The patient was given a full consent for superficial radiation to both verbally and in writing and the full determination of patient's eligibility for treatment and selection is outlined on the patient eligibility and treatment selection form.  The specific superficial radiotherapy prescription was determined and was documented on the superficial radiotherapy prescription form.  A treatment calculation was also performed and documented on the treatment calculation form.  Based on the prescription, the patient was scheduled for a series of fractional treatments.
Detail Level: Detailed
Initial Radiation Treatment Planning (Will Render If Bill Simulation = Yes): The patient had a complete consultation regarding all applicable modalities for the treatment of their skin cancer and based on a variety of factors including the type of lesion, size, and location, the relevant medical history as well as local tissue factors, the functional status of the individual, the ability to perform necessary postoperative wound instructions and the need for simultaneous treatments as well as overall wound healing status, it was determined that the patient would begin radiation therapy treatment for skin cancer.  A full simulation and treatment device design was performed including the determination and formulation of appropriate simple and complex devices including lead shield of 0.762 mm thickness to form molded customized shielding to specifically correlate with the lesion size including treatment margin.  The custom lead shield is adequate to accommodate the appropriate applicator and provide adequate shielding around the treatment site.  The specific field applicator, shields, and devices both simple and complex as well as the specific patient setup is outlined below.  The patient was given a full consent for superficial radiation to both verbally and in writing and the full determination of patient's eligibility for treatment and selection is outlined on the patient eligibility and treatment selection form.  The specific superficial radiotherapy prescription was determined and was documented on the superficial radiotherapy prescription form.  A treatment calculation was also performed and documented on the treatment calculation form.  Based on the prescription, the patient was scheduled for a series of fractional treatments.
Treatment Time In Min (Optional): 0.33
Treatment Device Design After Initial Simulation Justification (Will Render If Bill For Treatment Devices = Yes): The patient is status post radiation simulation and is evaluated as to the use of additional devices for shielding and placement for radiation therapy.
Functional Status: 0 (fully active)
Intro Statement (Will Not Render If Left Blank): The patient is undergoing superficial radiation therapy for skin cancer and presents for weekly evaluation and management.  Per protocol and as documented on the flow sheet, the patient was questioned as to subjective redness, pruritus, pain, drainage, fatigue, or any other symptoms.  Objectively, the radiation area was evaluated with regards to erythema, atrophy, scale, crusting, erosion, ulceration, edema, purpura, tenderness, warmth, drainage, and any other findings.  The plan was extensively reviewed including the dose, and dosing schedule.  The simulation and clinical setup was also reviewed as was the external and any internal shields and based on this review the appropriateness and sufficiency of treatment was determined.
Treatment Time / Fractionation (Optional- Include Units): 0.33min

## 2021-01-31 ENCOUNTER — APPOINTMENT (OUTPATIENT)
Dept: URBAN - METROPOLITAN AREA CLINIC 273 | Age: 76
Setting detail: DERMATOLOGY
End: 2021-02-17

## 2021-01-31 PROCEDURE — 77336 RADIATION PHYSICS CONSULT: CPT

## 2021-02-01 ENCOUNTER — APPOINTMENT (OUTPATIENT)
Dept: URBAN - METROPOLITAN AREA CLINIC 273 | Age: 76
Setting detail: DERMATOLOGY
End: 2021-02-01

## 2021-02-01 PROBLEM — C44.729 SQUAMOUS CELL CARCINOMA OF SKIN OF LEFT LOWER LIMB, INCLUDING HIP: Status: ACTIVE | Noted: 2021-02-01

## 2021-02-01 PROCEDURE — OTHER SUPERFICIAL RADIATION TREATMENT: OTHER

## 2021-02-01 PROCEDURE — 77280 THER RAD SIMULAJ FIELD SMPL: CPT

## 2021-02-01 PROCEDURE — OTHER TREATMENT REGIMEN: OTHER

## 2021-02-01 PROCEDURE — G6001 ECHO GUIDANCE RADIOTHERAPY: HCPCS

## 2021-02-01 PROCEDURE — 77401 RADIATION TX DELIVERY SUPFC: CPT

## 2021-02-01 NOTE — PROCEDURE: TREATMENT REGIMEN
Plan: This patient has been treated today with image guided superficial radiation therapy for non-melanoma skin cancer.  Written informed consent has been previously obtained from this patient for this treatment. This consent is documented in the patient’s chart. The patient gave verbal consent to continue treatment today.\\nThe patient was treated with a specific radiation dose and setup as prescribed by the provider listed on this visit note. A Radiation Therapist performed administration of radiation under supervision of provider. The treatment parameters and cumulative dose are indicated above.\\nPrior to administering the radiation, the patient underwent a verification therapeutic radiology simulation-aided field setting defining relevant normal and abnormal target anatomy and acquiring images with high frequency ultrasound in addition to data necessary developing optimal radiation treatment process for the patient. This process includes verification of the treatment port(s) and proper treatment positioning. All treatment ports were photographed within electronic medical record. The patient’s customized lead blocking along with gross tumor volume and margin was confirmed. Considering superficial radiotherapy is clinical in setup, this requires physician and radiation therapist to clarify location interest being treated against initial images, pathology and patient anatomy. Care was taken ensuring fields treated were geometrically accurate and properly positioned using therapeutic radiology simulation-aided field setting verification per fraction. This process is also utilized to determine if any prescription or setup changes are necessary. These steps are therefore medically necessary ensuring safe and effective administration of radiation. Ongoing therapeutic radiology simulation-aided field setting verification is ordered throughout course of therapy.\\nA high frequency ultrasound image was acquired today for two-dimensional evaluation of the tumor volume and response to treatment, in addition to geometric accuracy of field placement. US depth is 2.00mm, which is +0.42mm in difference from previous imaging. The field placement and ultrasound imaging, per fraction, is separate and distinct from the initial simulation, and is an important task in providing safe administration of superficial radiation therapy. Physician evaluation of the ultrasound tumor depth will be ongoing throughout the course of treatment and is deemed medically necessary in order to ensure the efficacy of treatment and any necessary changes. Today’s image was evaluated for determination of continuation of treatment with the current plan or with necessary changes as appropriate. According to provider review of verification therapeutic radiology simulation-aided field setting and imaging, no change is required. Additionally, the use of ultrasound visualization and targeted assessment allows the patient to be able to see their cancer(s) progress, encouraging patient to complete and maintain compliance through full course of radiotherapy.\\nPer Dr. Kristopher Finnegan, continued ultrasound guidance and therapeutic radiology simulation-aided field setting verification per fraction is required for field placement, measurement of tumor depth, progress and acute effect monitoring.
Detail Level: Zone

## 2021-02-01 NOTE — PROCEDURE: SUPERFICIAL RADIATION TREATMENT
Render Text From Evaluation And Management Tab (Will Not Bill 58609): No
Simple Simulation Afterword Text Will Be Included With Simple Simulations (Indications............): The patient had a complete consultation regarding all applicable modalities for the treatment of their lesion and based on a variety of factors including the type of lesion, size, and location, the relevant medical history as well as local tissue factors, the functional status of the individual, the ability to perform necessary postoperative wound instructions and the need for simultaneous treatments as well as overall wound healing status, it was determined that the patient would begin radiation therapy treatment for skin cancer.  A full simulation and treatment device design was performed including the determination and formulation of appropriate simple and complex devices including lead shield of 2.286 mm thickness to form molded customized shielding to specifically correlate with the lesion size including treatment margin.  The custom lead shield is adequate to accommodate the appropriate applicator and provide adequate shielding around the treatment site.  The specific field applicator, shields, and devices both simple and complex as well as the specific patient setup is outlined below.  The patient was given a full consent for superficial radiation to both verbally and in writing and the full determination of patient's eligibility for treatment and selection is outlined on the patient eligibility and treatment selection form.  The specific superficial radiotherapy prescription was determined and was documented on the superficial radiotherapy prescription form.  A treatment calculation was also performed and documented on the treatment calculation form.  Based on the prescription, the patient was scheduled for a series of fractional treatments.
Fractions / Week: 3
Dose / Tx In Cgy (Optional): 230.01
Field Size (Applicator): 4.0 cm
Include Rx 4 When Rendering Additional Prescriptions: Yes
Number Of Days Off Treatment: 1
Fractions / Week Rx 4: 5
Detail Level: Detailed
Treatment Time / Fractionation (Optional- Include Units): 0.33min
Energy (Optional-Please Include Units): 70kV
Cumulative Dose In Cgy (Optional): 1610.07
Total Number Of Fractions: 25
Computed Treatment Time In Min (Will Render The Same As Calculated Treatment Time If Left Blank): 0.33
Assessment: Appropriate reaction
Fractionation Number: 7
Custom Shielding Afterword Text Will Not Be Included With Simple Simulations (X X Y Cm............): port to correlate with the lesion size, including treatment margin. The custom lead shield is adequate to accommodate the appropriate applicator and provide adequate shielding around the treatment site. Additional shielding (as noted below) is used to protect sensitive, normal tissues.
Comments: RTOG 1
Patient Positioning: Supine
Total Number Of Fractions Rx 4: 15
Treatment Margins In Cm: 0.8
Total Dose (Optional-Please Include Units): 5750.25cGy
Day Of The Week Treatment Administered: Monday
Initial Radiation Treatment Planning (Will Render If Bill Simulation = Yes): The patient had a complete consultation regarding all applicable modalities for the treatment of their skin cancer and based on a variety of factors including the type of lesion, size, and location, the relevant medical history as well as local tissue factors, the functional status of the individual, the ability to perform necessary postoperative wound instructions and the need for simultaneous treatments as well as overall wound healing status, it was determined that the patient would begin radiation therapy treatment for skin cancer.  A full simulation and treatment device design was performed including the determination and formulation of appropriate simple and complex devices including lead shield of 0.762 mm thickness to form molded customized shielding to specifically correlate with the lesion size including treatment margin.  The custom lead shield is adequate to accommodate the appropriate applicator and provide adequate shielding around the treatment site.  The specific field applicator, shields, and devices both simple and complex as well as the specific patient setup is outlined below.  The patient was given a full consent for superficial radiation to both verbally and in writing and the full determination of patient's eligibility for treatment and selection is outlined on the patient eligibility and treatment selection form.  The specific superficial radiotherapy prescription was determined and was documented on the superficial radiotherapy prescription form.  A treatment calculation was also performed and documented on the treatment calculation form.  Based on the prescription, the patient was scheduled for a series of fractional treatments.
Dimensions-X Axis In Cm: 1.5
Intro Statement (Will Not Render If Left Blank): The patient is undergoing superficial radiation therapy for skin cancer and presents for weekly evaluation and management.  Per protocol and as documented on the flow sheet, the patient was questioned as to subjective redness, pruritus, pain, drainage, fatigue, or any other symptoms.  Objectively, the radiation area was evaluated with regards to erythema, atrophy, scale, crusting, erosion, ulceration, edema, purpura, tenderness, warmth, drainage, and any other findings.  The plan was extensively reviewed including the dose, and dosing schedule.  The simulation and clinical setup was also reviewed as was the external and any internal shields and based on this review the appropriateness and sufficiency of treatment was determined.
Time Dose Fractionation (Optional- Include Units If Applicable): 93
Information: Selecting Yes will display possible errors in your note based on the variables you have selected. This validation is only offered as a suggestion for you. PLEASE NOTE THAT THE VALIDATION TEXT WILL BE REMOVED WHEN YOU FINALIZE YOUR NOTE. IF YOU WANT TO FAX A PRELIMINARY NOTE YOU WILL NEED TO TOGGLE THIS TO 'NO' IF YOU DO NOT WANT IT IN YOUR FAXED NOTE.
Treatment Device Design After Initial Simulation Justification (Will Render If Bill For Treatment Devices = Yes): The patient is status post radiation simulation and is evaluated as to the use of additional devices for shielding and placement for radiation therapy.
Limb Positioning Or Elevation: Legs Extended
Simple Simulation Preamble Text Will Be Included With Simple Simulations (.......... Indications): Simple simulation was performed today for the following reasons:
Depth (Optional-Please Include Units): 1.98
Pathology Override (Pathology Will Render As Diagnosis Name If Left Blank): Squamous Cell Carcinoma-Invasive
Dose Per Fractionation In Cgy (Optional): 230.01cGy
Shielding Size (Optional- Include Units): 3.0x3.0
Additional Prescription Justification Text: If there is any interruption in treatment exceeding 5 days please see Decay and Dose Adjustment Calculation and complete treatment under Prescription 2, 3 or 4 as appropriate.
Custom Shielding Preamble Text Will Not Be Included With Simple Simulations (.......... X X Y Cm): A lead shield of 0.762 mm thickness is utilized to form a molded, custom shield with a
Functional Status: 0 (fully active)

## 2021-02-02 ENCOUNTER — APPOINTMENT (OUTPATIENT)
Dept: URBAN - METROPOLITAN AREA CLINIC 273 | Age: 76
Setting detail: DERMATOLOGY
End: 2021-02-02

## 2021-02-02 PROBLEM — C44.729 SQUAMOUS CELL CARCINOMA OF SKIN OF LEFT LOWER LIMB, INCLUDING HIP: Status: ACTIVE | Noted: 2021-02-02

## 2021-02-02 PROCEDURE — G6001 ECHO GUIDANCE RADIOTHERAPY: HCPCS

## 2021-02-02 PROCEDURE — OTHER TREATMENT REGIMEN: OTHER

## 2021-02-02 PROCEDURE — OTHER SUPERFICIAL RADIATION TREATMENT: OTHER

## 2021-02-02 PROCEDURE — 77280 THER RAD SIMULAJ FIELD SMPL: CPT

## 2021-02-02 PROCEDURE — 77401 RADIATION TX DELIVERY SUPFC: CPT

## 2021-02-02 NOTE — PROCEDURE: TREATMENT REGIMEN
Detail Level: Zone
Plan: This patient has been treated today with image guided superficial radiation therapy for non-melanoma skin cancer.  Written informed consent has been previously obtained from this patient for this treatment. This consent is documented in the patient’s chart. The patient gave verbal consent to continue treatment today.\\nThe patient was treated with a specific radiation dose and setup as prescribed by the provider listed on this visit note. A Radiation Therapist performed administration of radiation under supervision of provider. The treatment parameters and cumulative dose are indicated above.\\nPrior to administering the radiation, the patient underwent a verification therapeutic radiology simulation-aided field setting defining relevant normal and abnormal target anatomy and acquiring images with high frequency ultrasound in addition to data necessary developing optimal radiation treatment process for the patient. This process includes verification of the treatment port(s) and proper treatment positioning. All treatment ports were photographed within electronic medical record. The patient’s customized lead blocking along with gross tumor volume and margin was confirmed. Considering superficial radiotherapy is clinical in setup, this requires physician and radiation therapist to clarify location interest being treated against initial images, pathology and patient anatomy. Care was taken ensuring fields treated were geometrically accurate and properly positioned using therapeutic radiology simulation-aided field setting verification per fraction. This process is also utilized to determine if any prescription or setup changes are necessary. These steps are therefore medically necessary ensuring safe and effective administration of radiation. Ongoing therapeutic radiology simulation-aided field setting verification is ordered throughout course of therapy.\\nA high frequency ultrasound image was acquired today for two-dimensional evaluation of the tumor volume and response to treatment, in addition to geometric accuracy of field placement. US depth is 1.44mm, which is -0.56mm in difference from previous imaging. The field placement and ultrasound imaging, per fraction, is separate and distinct from the initial simulation, and is an important task in providing safe administration of superficial radiation therapy. Physician evaluation of the ultrasound tumor depth will be ongoing throughout the course of treatment and is deemed medically necessary in order to ensure the efficacy of treatment and any necessary changes. Today’s image was evaluated for determination of continuation of treatment with the current plan or with necessary changes as appropriate. According to provider review of verification therapeutic radiology simulation-aided field setting and imaging, no change is required. Additionally, the use of ultrasound visualization and targeted assessment allows the patient to be able to see their cancer(s) progress, encouraging patient to complete and maintain compliance through full course of radiotherapy.\\nPer Dr. Kristopher Finnegan, continued ultrasound guidance and therapeutic radiology simulation-aided field setting verification per fraction is required for field placement, measurement of tumor depth, progress and acute effect monitoring.

## 2021-02-02 NOTE — PROCEDURE: SUPERFICIAL RADIATION TREATMENT
Pathology Override (Pathology Will Render As Diagnosis Name If Left Blank): Squamous Cell Carcinoma-Invasive
Bill For Treatment Devices Only: No
Simple Simulation Preamble Text Will Be Included With Simple Simulations (.......... Indications): Simple simulation was performed today for the following reasons:
Fractions / Week Rx 3: 5
Field Size (Applicator): 4.0 cm
Dose / Tx In Cgy (Optional): 230.01
Treatment Time / Fractionation (Optional- Include Units): 0.33min
Energy (Optional-Please Include Units): 70kV
Number Of Days Off Treatment: 1
Limb Positioning Or Elevation: Legs Extended
Dimensions-Y Axis In Cm: 1.5
Additional Prescription Justification Text: If there is any interruption in treatment exceeding 5 days please see Decay and Dose Adjustment Calculation and complete treatment under Prescription 2, 3 or 4 as appropriate.
Initial Radiation Treatment Planning (Will Render If Bill Simulation = Yes): The patient had a complete consultation regarding all applicable modalities for the treatment of their skin cancer and based on a variety of factors including the type of lesion, size, and location, the relevant medical history as well as local tissue factors, the functional status of the individual, the ability to perform necessary postoperative wound instructions and the need for simultaneous treatments as well as overall wound healing status, it was determined that the patient would begin radiation therapy treatment for skin cancer.  A full simulation and treatment device design was performed including the determination and formulation of appropriate simple and complex devices including lead shield of 0.762 mm thickness to form molded customized shielding to specifically correlate with the lesion size including treatment margin.  The custom lead shield is adequate to accommodate the appropriate applicator and provide adequate shielding around the treatment site.  The specific field applicator, shields, and devices both simple and complex as well as the specific patient setup is outlined below.  The patient was given a full consent for superficial radiation to both verbally and in writing and the full determination of patient's eligibility for treatment and selection is outlined on the patient eligibility and treatment selection form.  The specific superficial radiotherapy prescription was determined and was documented on the superficial radiotherapy prescription form.  A treatment calculation was also performed and documented on the treatment calculation form.  Based on the prescription, the patient was scheduled for a series of fractional treatments.
Include Rx 4 When Rendering Additional Prescriptions: Yes
Custom Shielding Preamble Text Will Not Be Included With Simple Simulations (.......... X X Y Cm): A lead shield of 0.762 mm thickness is utilized to form a molded, custom shield with a
Simple Simulation Afterword Text Will Be Included With Simple Simulations (Indications............): The patient had a complete consultation regarding all applicable modalities for the treatment of their lesion and based on a variety of factors including the type of lesion, size, and location, the relevant medical history as well as local tissue factors, the functional status of the individual, the ability to perform necessary postoperative wound instructions and the need for simultaneous treatments as well as overall wound healing status, it was determined that the patient would begin radiation therapy treatment for skin cancer.  A full simulation and treatment device design was performed including the determination and formulation of appropriate simple and complex devices including lead shield of 2.286 mm thickness to form molded customized shielding to specifically correlate with the lesion size including treatment margin.  The custom lead shield is adequate to accommodate the appropriate applicator and provide adequate shielding around the treatment site.  The specific field applicator, shields, and devices both simple and complex as well as the specific patient setup is outlined below.  The patient was given a full consent for superficial radiation to both verbally and in writing and the full determination of patient's eligibility for treatment and selection is outlined on the patient eligibility and treatment selection form.  The specific superficial radiotherapy prescription was determined and was documented on the superficial radiotherapy prescription form.  A treatment calculation was also performed and documented on the treatment calculation form.  Based on the prescription, the patient was scheduled for a series of fractional treatments.
Cumulative Dose In Cgy (Optional): 1840.08
Total Number Of Fractions: 25
Computed Treatment Time In Min (Will Render The Same As Calculated Treatment Time If Left Blank): 0.33
Total Number Of Fractions Rx 3: 15
Total Number Of Fractions Rx 2: 3
Comments: RTOG 1
Assessment: Appropriate reaction
Treatment Margins In Cm: 0.8
Fractionation Number: 8
Patient Positioning: Supine
Day Of The Week Treatment Administered: Tuesday
Total Dose (Optional-Please Include Units): 5750.25cGy
Intro Statement (Will Not Render If Left Blank): The patient is undergoing superficial radiation therapy for skin cancer and presents for weekly evaluation and management.  Per protocol and as documented on the flow sheet, the patient was questioned as to subjective redness, pruritus, pain, drainage, fatigue, or any other symptoms.  Objectively, the radiation area was evaluated with regards to erythema, atrophy, scale, crusting, erosion, ulceration, edema, purpura, tenderness, warmth, drainage, and any other findings.  The plan was extensively reviewed including the dose, and dosing schedule.  The simulation and clinical setup was also reviewed as was the external and any internal shields and based on this review the appropriateness and sufficiency of treatment was determined.
Functional Status: 0 (fully active)
Information: Selecting Yes will display possible errors in your note based on the variables you have selected. This validation is only offered as a suggestion for you. PLEASE NOTE THAT THE VALIDATION TEXT WILL BE REMOVED WHEN YOU FINALIZE YOUR NOTE. IF YOU WANT TO FAX A PRELIMINARY NOTE YOU WILL NEED TO TOGGLE THIS TO 'NO' IF YOU DO NOT WANT IT IN YOUR FAXED NOTE.
Detail Level: Detailed
Time Dose Fractionation (Optional- Include Units If Applicable): 93
Depth (Optional-Please Include Units): 1.98
Daily Fractionated Dose (Optional- Include Units): 230.01cGy
Shielding Size (Optional- Include Units): 3.0x3.0
Treatment Device Design After Initial Simulation Justification (Will Render If Bill For Treatment Devices = Yes): The patient is status post radiation simulation and is evaluated as to the use of additional devices for shielding and placement for radiation therapy.
Custom Shielding Afterword Text Will Not Be Included With Simple Simulations (X X Y Cm............): port to correlate with the lesion size, including treatment margin. The custom lead shield is adequate to accommodate the appropriate applicator and provide adequate shielding around the treatment site. Additional shielding (as noted below) is used to protect sensitive, normal tissues.

## 2021-02-04 ENCOUNTER — APPOINTMENT (OUTPATIENT)
Dept: URBAN - METROPOLITAN AREA CLINIC 273 | Age: 76
Setting detail: DERMATOLOGY
End: 2021-02-04

## 2021-02-04 PROBLEM — C44.729 SQUAMOUS CELL CARCINOMA OF SKIN OF LEFT LOWER LIMB, INCLUDING HIP: Status: ACTIVE | Noted: 2021-02-04

## 2021-02-04 PROCEDURE — 77401 RADIATION TX DELIVERY SUPFC: CPT

## 2021-02-04 PROCEDURE — 77280 THER RAD SIMULAJ FIELD SMPL: CPT

## 2021-02-04 PROCEDURE — OTHER TREATMENT REGIMEN: OTHER

## 2021-02-04 PROCEDURE — OTHER SUPERFICIAL RADIATION TREATMENT: OTHER

## 2021-02-04 PROCEDURE — G6001 ECHO GUIDANCE RADIOTHERAPY: HCPCS

## 2021-02-04 NOTE — PROCEDURE: TREATMENT REGIMEN
Detail Level: Zone
Plan: This patient has been treated today with image guided superficial radiation therapy for non-melanoma skin cancer.  Written informed consent has been previously obtained from this patient for this treatment. This consent is documented in the patient’s chart. The patient gave verbal consent to continue treatment today.\\nThe patient was treated with a specific radiation dose and setup as prescribed by the provider listed on this visit note. A Radiation Therapist performed administration of radiation under supervision of provider. The treatment parameters and cumulative dose are indicated above.\\nPrior to administering the radiation, the patient underwent a verification therapeutic radiology simulation-aided field setting defining relevant normal and abnormal target anatomy and acquiring images with high frequency ultrasound in addition to data necessary developing optimal radiation treatment process for the patient. This process includes verification of the treatment port(s) and proper treatment positioning. All treatment ports were photographed within electronic medical record. The patient’s customized lead blocking along with gross tumor volume and margin was confirmed. Considering superficial radiotherapy is clinical in setup, this requires physician and radiation therapist to clarify location interest being treated against initial images, pathology and patient anatomy. Care was taken ensuring fields treated were geometrically accurate and properly positioned using therapeutic radiology simulation-aided field setting verification per fraction. This process is also utilized to determine if any prescription or setup changes are necessary. These steps are therefore medically necessary ensuring safe and effective administration of radiation. Ongoing therapeutic radiology simulation-aided field setting verification is ordered throughout course of therapy.\\nA high frequency ultrasound image was acquired today for two-dimensional evaluation of the tumor volume and response to treatment, in addition to geometric accuracy of field placement. US depth is 1.35mm, which is -0.09mm in difference from previous imaging. The field placement and ultrasound imaging, per fraction, is separate and distinct from the initial simulation, and is an important task in providing safe administration of superficial radiation therapy. Physician evaluation of the ultrasound tumor depth will be ongoing throughout the course of treatment and is deemed medically necessary in order to ensure the efficacy of treatment and any necessary changes. Today’s image was evaluated for determination of continuation of treatment with the current plan or with necessary changes as appropriate. According to provider review of verification therapeutic radiology simulation-aided field setting and imaging, no change is required. Additionally, the use of ultrasound visualization and targeted assessment allows the patient to be able to see their cancer(s) progress, encouraging patient to complete and maintain compliance through full course of radiotherapy.\\nPer Dr. Kristopher Finnegan, continued ultrasound guidance and therapeutic radiology simulation-aided field setting verification per fraction is required for field placement, measurement of tumor depth, progress and acute effect monitoring.

## 2021-02-04 NOTE — PROCEDURE: SUPERFICIAL RADIATION TREATMENT
Additional Prescription Justification Text: If there is any interruption in treatment exceeding 5 days please see Decay and Dose Adjustment Calculation and complete treatment under Prescription 2, 3 or 4 as appropriate.
Energy (Optional-Please Include Units): 70kV
Daily Fractionated Dose (Optional- Include Units): 230.01cGy
Fractions / Week Rx 2: 3
Comments: RTOG 1
Dimensions-X Axis In Cm: 1.5
Field Size (Applicator): 4.0 cm
Include Rx 4 When Rendering Additional Prescriptions: Yes
Functional Status: 0 (fully active)
Initial Radiation Treatment Planning (Will Render If Bill Simulation = Yes): The patient had a complete consultation regarding all applicable modalities for the treatment of their skin cancer and based on a variety of factors including the type of lesion, size, and location, the relevant medical history as well as local tissue factors, the functional status of the individual, the ability to perform necessary postoperative wound instructions and the need for simultaneous treatments as well as overall wound healing status, it was determined that the patient would begin radiation therapy treatment for skin cancer.  A full simulation and treatment device design was performed including the determination and formulation of appropriate simple and complex devices including lead shield of 0.762 mm thickness to form molded customized shielding to specifically correlate with the lesion size including treatment margin.  The custom lead shield is adequate to accommodate the appropriate applicator and provide adequate shielding around the treatment site.  The specific field applicator, shields, and devices both simple and complex as well as the specific patient setup is outlined below.  The patient was given a full consent for superficial radiation to both verbally and in writing and the full determination of patient's eligibility for treatment and selection is outlined on the patient eligibility and treatment selection form.  The specific superficial radiotherapy prescription was determined and was documented on the superficial radiotherapy prescription form.  A treatment calculation was also performed and documented on the treatment calculation form.  Based on the prescription, the patient was scheduled for a series of fractional treatments.
Number Of Treatment Days: 1
Total Dose (Optional-Please Include Units): 5750.25cGy
Cumulative Dose In Cgy (Optional): 2070.09
Treatment Time / Fractionation (Optional- Include Units): 0.33min
Treatment Time In Min (Optional): 0.33
Intro Statement (Will Not Render If Left Blank): The patient is undergoing superficial radiation therapy for skin cancer and presents for weekly evaluation and management.  Per protocol and as documented on the flow sheet, the patient was questioned as to subjective redness, pruritus, pain, drainage, fatigue, or any other symptoms.  Objectively, the radiation area was evaluated with regards to erythema, atrophy, scale, crusting, erosion, ulceration, edema, purpura, tenderness, warmth, drainage, and any other findings.  The plan was extensively reviewed including the dose, and dosing schedule.  The simulation and clinical setup was also reviewed as was the external and any internal shields and based on this review the appropriateness and sufficiency of treatment was determined.
Simple Simulation Preamble Text Will Be Included With Simple Simulations (.......... Indications): Simple simulation was performed today for the following reasons:
Assessment: Appropriate reaction
Dose / Tx In Cgy (Optional): 230.01
Depth (Optional-Please Include Units): 1.98
Bill And Render Text From Evaluation And Management Tab (Will Bill [id number]): No
Fractions / Week Rx 3: 5
Detail Level: Detailed
Treatment Margins In Cm: 0.8
Simple Simulation Afterword Text Will Be Included With Simple Simulations (Indications............): The patient had a complete consultation regarding all applicable modalities for the treatment of their lesion and based on a variety of factors including the type of lesion, size, and location, the relevant medical history as well as local tissue factors, the functional status of the individual, the ability to perform necessary postoperative wound instructions and the need for simultaneous treatments as well as overall wound healing status, it was determined that the patient would begin radiation therapy treatment for skin cancer.  A full simulation and treatment device design was performed including the determination and formulation of appropriate simple and complex devices including lead shield of 2.286 mm thickness to form molded customized shielding to specifically correlate with the lesion size including treatment margin.  The custom lead shield is adequate to accommodate the appropriate applicator and provide adequate shielding around the treatment site.  The specific field applicator, shields, and devices both simple and complex as well as the specific patient setup is outlined below.  The patient was given a full consent for superficial radiation to both verbally and in writing and the full determination of patient's eligibility for treatment and selection is outlined on the patient eligibility and treatment selection form.  The specific superficial radiotherapy prescription was determined and was documented on the superficial radiotherapy prescription form.  A treatment calculation was also performed and documented on the treatment calculation form.  Based on the prescription, the patient was scheduled for a series of fractional treatments.
Fractionation Number: 9
Total Number Of Fractions Rx 3: 15
Custom Shielding Preamble Text Will Not Be Included With Simple Simulations (.......... X X Y Cm): A lead shield of 0.762 mm thickness is utilized to form a molded, custom shield with a
Limb Positioning Or Elevation: Legs Extended
Treatment Device Design After Initial Simulation Justification (Will Render If Bill For Treatment Devices = Yes): The patient is status post radiation simulation and is evaluated as to the use of additional devices for shielding and placement for radiation therapy.
Pathology Override (Pathology Will Render As Diagnosis Name If Left Blank): Squamous Cell Carcinoma-Invasive
Time Dose Fractionation (Optional- Include Units If Applicable): 93
Patient Positioning: Supine
Custom Shielding Afterword Text Will Not Be Included With Simple Simulations (X X Y Cm............): port to correlate with the lesion size, including treatment margin. The custom lead shield is adequate to accommodate the appropriate applicator and provide adequate shielding around the treatment site. Additional shielding (as noted below) is used to protect sensitive, normal tissues.
Information: Selecting Yes will display possible errors in your note based on the variables you have selected. This validation is only offered as a suggestion for you. PLEASE NOTE THAT THE VALIDATION TEXT WILL BE REMOVED WHEN YOU FINALIZE YOUR NOTE. IF YOU WANT TO FAX A PRELIMINARY NOTE YOU WILL NEED TO TOGGLE THIS TO 'NO' IF YOU DO NOT WANT IT IN YOUR FAXED NOTE.
Day Of The Week Treatment Administered: Thursday
Total Number Of Fractions: 25
Shielding Size (Optional- Include Units): 3.0x3.0

## 2021-02-08 ENCOUNTER — APPOINTMENT (OUTPATIENT)
Dept: URBAN - METROPOLITAN AREA CLINIC 273 | Age: 76
Setting detail: DERMATOLOGY
End: 2021-02-08

## 2021-02-08 PROBLEM — C44.729 SQUAMOUS CELL CARCINOMA OF SKIN OF LEFT LOWER LIMB, INCLUDING HIP: Status: ACTIVE | Noted: 2021-02-08

## 2021-02-08 PROCEDURE — OTHER TREATMENT REGIMEN: OTHER

## 2021-02-08 PROCEDURE — 77401 RADIATION TX DELIVERY SUPFC: CPT

## 2021-02-08 PROCEDURE — 77427 RADIATION TX MANAGEMENT X5: CPT

## 2021-02-08 PROCEDURE — G6001 ECHO GUIDANCE RADIOTHERAPY: HCPCS

## 2021-02-08 PROCEDURE — 77280 THER RAD SIMULAJ FIELD SMPL: CPT

## 2021-02-08 PROCEDURE — OTHER SUPERFICIAL RADIATION TREATMENT: OTHER

## 2021-02-08 NOTE — PROCEDURE: TREATMENT REGIMEN
Detail Level: Zone
Plan: This patient has been treated today with image guided superficial radiation therapy for non-melanoma skin cancer.  Written informed consent has been previously obtained from this patient for this treatment. This consent is documented in the patient’s chart. The patient gave verbal consent to continue treatment today.\\nThe patient was treated with a specific radiation dose and setup as prescribed by the provider listed on this visit note. A Radiation Therapist performed administration of radiation under supervision of provider. The treatment parameters and cumulative dose are indicated above.\\nPrior to administering the radiation, the patient underwent a verification therapeutic radiology simulation-aided field setting defining relevant normal and abnormal target anatomy and acquiring images with high frequency ultrasound in addition to data necessary developing optimal radiation treatment process for the patient. This process includes verification of the treatment port(s) and proper treatment positioning. All treatment ports were photographed within electronic medical record. The patient’s customized lead blocking along with gross tumor volume and margin was confirmed. Considering superficial radiotherapy is clinical in setup, this requires physician and radiation therapist to clarify location interest being treated against initial images, pathology and patient anatomy. Care was taken ensuring fields treated were geometrically accurate and properly positioned using therapeutic radiology simulation-aided field setting verification per fraction. This process is also utilized to determine if any prescription or setup changes are necessary. These steps are therefore medically necessary ensuring safe and effective administration of radiation. Ongoing therapeutic radiology simulation-aided field setting verification is ordered throughout course of therapy.\\nA high frequency ultrasound image was acquired today for two-dimensional evaluation of the tumor volume and response to treatment, in addition to geometric accuracy of field placement. US depth is 1.36mm, which is +0.01mm in difference from previous imaging. The field placement and ultrasound imaging, per fraction, is separate and distinct from the initial simulation, and is an important task in providing safe administration of superficial radiation therapy. Physician evaluation of the ultrasound tumor depth will be ongoing throughout the course of treatment and is deemed medically necessary in order to ensure the efficacy of treatment and any necessary changes. Today’s image was evaluated for determination of continuation of treatment with the current plan or with necessary changes as appropriate. According to provider review of verification therapeutic radiology simulation-aided field setting and imaging, no change is required. Additionally, the use of ultrasound visualization and targeted assessment allows the patient to be able to see their cancer(s) progress, encouraging patient to complete and maintain compliance through full course of radiotherapy.\\nPer Dr. Kristopher Finnegan, continued ultrasound guidance and therapeutic radiology simulation-aided field setting verification per fraction is required for field placement, measurement of tumor depth, progress and acute effect monitoring.

## 2021-02-08 NOTE — PROCEDURE: SUPERFICIAL RADIATION TREATMENT
Daily Fractionated Dose (Optional- Include Units): 230.01cGy
Bill For Dosimetry/Render Treatment Time Calculation In Note: No
Field Size (Applicator): 4.0 cm
Treatment Time In Min (Optional): 0.33
Number Of Days Off Treatment: 1
Total Number Of Fractions Rx 3: 15
Initial Radiation Treatment Planning (Will Render If Bill Simulation = Yes): The patient had a complete consultation regarding all applicable modalities for the treatment of their skin cancer and based on a variety of factors including the type of lesion, size, and location, the relevant medical history as well as local tissue factors, the functional status of the individual, the ability to perform necessary postoperative wound instructions and the need for simultaneous treatments as well as overall wound healing status, it was determined that the patient would begin radiation therapy treatment for skin cancer.  A full simulation and treatment device design was performed including the determination and formulation of appropriate simple and complex devices including lead shield of 0.762 mm thickness to form molded customized shielding to specifically correlate with the lesion size including treatment margin.  The custom lead shield is adequate to accommodate the appropriate applicator and provide adequate shielding around the treatment site.  The specific field applicator, shields, and devices both simple and complex as well as the specific patient setup is outlined below.  The patient was given a full consent for superficial radiation to both verbally and in writing and the full determination of patient's eligibility for treatment and selection is outlined on the patient eligibility and treatment selection form.  The specific superficial radiotherapy prescription was determined and was documented on the superficial radiotherapy prescription form.  A treatment calculation was also performed and documented on the treatment calculation form.  Based on the prescription, the patient was scheduled for a series of fractional treatments.
Day Of The Week Treatment Administered: Monday
Shielding Size (Optional- Include Units): 3.0x3.0
Custom Shielding Preamble Text Will Not Be Included With Simple Simulations (.......... X X Y Cm): A lead shield of 0.762 mm thickness is utilized to form a molded, custom shield with a
Detail Level: Detailed
Total Number Of Fractions Rx 2: 3
Fractionation Number: 10
Cumulative Dose In Cgy (Optional): 2300.10
Patient Positioning: Supine
Dimensions-Y Axis In Cm: 1.5
Total Dose (Optional-Please Include Units): 5750.25cGy
Functional Status: 0 (fully active)
Simple Simulation Preamble Text Will Be Included With Simple Simulations (.......... Indications): Simple simulation was performed today for the following reasons:
Energy (Optional-Please Include Units): 70kV
Assessment: Appropriate reaction
Pathology Override (Pathology Will Render As Diagnosis Name If Left Blank): Squamous Cell Carcinoma-Invasive
Treatment Margins In Cm: 0.8
Fractions / Week Rx 3: 5
Intro Statement (Will Not Render If Left Blank): The patient is undergoing superficial radiation therapy for skin cancer and presents for weekly evaluation and management.  Per protocol and as documented on the flow sheet, the patient was questioned as to subjective redness, pruritus, pain, drainage, fatigue, or any other symptoms.  Objectively, the radiation area was evaluated with regards to erythema, atrophy, scale, crusting, erosion, ulceration, edema, purpura, tenderness, warmth, drainage, and any other findings.  The plan was extensively reviewed including the dose, and dosing schedule.  The simulation and clinical setup was also reviewed as was the external and any internal shields and based on this review the appropriateness and sufficiency of treatment was determined.
Treatment Device Design After Initial Simulation Justification (Will Render If Bill For Treatment Devices = Yes): The patient is status post radiation simulation and is evaluated as to the use of additional devices for shielding and placement for radiation therapy.
Time Dose Fractionation (Optional- Include Units If Applicable): 93
Additional Prescription Justification Text: If there is any interruption in treatment exceeding 5 days please see Decay and Dose Adjustment Calculation and complete treatment under Prescription 2, 3 or 4 as appropriate.
Custom Shielding Afterword Text Will Not Be Included With Simple Simulations (X X Y Cm............): port to correlate with the lesion size, including treatment margin. The custom lead shield is adequate to accommodate the appropriate applicator and provide adequate shielding around the treatment site. Additional shielding (as noted below) is used to protect sensitive, normal tissues.
Validate Note Data (See Information Below): Yes
Information: Selecting Yes will display possible errors in your note based on the variables you have selected. This validation is only offered as a suggestion for you. PLEASE NOTE THAT THE VALIDATION TEXT WILL BE REMOVED WHEN YOU FINALIZE YOUR NOTE. IF YOU WANT TO FAX A PRELIMINARY NOTE YOU WILL NEED TO TOGGLE THIS TO 'NO' IF YOU DO NOT WANT IT IN YOUR FAXED NOTE.
Treatment Time / Fractionation (Optional- Include Units): 0.33min
Depth (Optional-Please Include Units): 1.98
Total Number Of Fractions: 25
Simple Simulation Afterword Text Will Be Included With Simple Simulations (Indications............): The patient had a complete consultation regarding all applicable modalities for the treatment of their lesion and based on a variety of factors including the type of lesion, size, and location, the relevant medical history as well as local tissue factors, the functional status of the individual, the ability to perform necessary postoperative wound instructions and the need for simultaneous treatments as well as overall wound healing status, it was determined that the patient would begin radiation therapy treatment for skin cancer.  A full simulation and treatment device design was performed including the determination and formulation of appropriate simple and complex devices including lead shield of 2.286 mm thickness to form molded customized shielding to specifically correlate with the lesion size including treatment margin.  The custom lead shield is adequate to accommodate the appropriate applicator and provide adequate shielding around the treatment site.  The specific field applicator, shields, and devices both simple and complex as well as the specific patient setup is outlined below.  The patient was given a full consent for superficial radiation to both verbally and in writing and the full determination of patient's eligibility for treatment and selection is outlined on the patient eligibility and treatment selection form.  The specific superficial radiotherapy prescription was determined and was documented on the superficial radiotherapy prescription form.  A treatment calculation was also performed and documented on the treatment calculation form.  Based on the prescription, the patient was scheduled for a series of fractional treatments.
Comments: RTOG 1
Limb Positioning Or Elevation: Legs Extended
Dose / Tx In Cgy (Optional): 230.01

## 2021-02-09 ENCOUNTER — APPOINTMENT (OUTPATIENT)
Dept: URBAN - METROPOLITAN AREA CLINIC 273 | Age: 76
Setting detail: DERMATOLOGY
End: 2021-02-09

## 2021-02-09 PROBLEM — C44.729 SQUAMOUS CELL CARCINOMA OF SKIN OF LEFT LOWER LIMB, INCLUDING HIP: Status: ACTIVE | Noted: 2021-02-09

## 2021-02-09 PROCEDURE — 77401 RADIATION TX DELIVERY SUPFC: CPT

## 2021-02-09 PROCEDURE — G6001 ECHO GUIDANCE RADIOTHERAPY: HCPCS

## 2021-02-09 PROCEDURE — 77280 THER RAD SIMULAJ FIELD SMPL: CPT

## 2021-02-09 PROCEDURE — OTHER TREATMENT REGIMEN: OTHER

## 2021-02-09 PROCEDURE — OTHER SUPERFICIAL RADIATION TREATMENT: OTHER

## 2021-02-09 NOTE — PROCEDURE: TREATMENT REGIMEN
Detail Level: Zone
Plan: This patient has been treated today with image guided superficial radiation therapy for non-melanoma skin cancer.  Written informed consent has been previously obtained from this patient for this treatment. This consent is documented in the patient’s chart. The patient gave verbal consent to continue treatment today.\\nThe patient was treated with a specific radiation dose and setup as prescribed by the provider listed on this visit note. A Radiation Therapist performed administration of radiation under supervision of provider. The treatment parameters and cumulative dose are indicated above.\\nPrior to administering the radiation, the patient underwent a verification therapeutic radiology simulation-aided field setting defining relevant normal and abnormal target anatomy and acquiring images with high frequency ultrasound in addition to data necessary developing optimal radiation treatment process for the patient. This process includes verification of the treatment port(s) and proper treatment positioning. All treatment ports were photographed within electronic medical record. The patient’s customized lead blocking along with gross tumor volume and margin was confirmed. Considering superficial radiotherapy is clinical in setup, this requires physician and radiation therapist to clarify location interest being treated against initial images, pathology and patient anatomy. Care was taken ensuring fields treated were geometrically accurate and properly positioned using therapeutic radiology simulation-aided field setting verification per fraction. This process is also utilized to determine if any prescription or setup changes are necessary. These steps are therefore medically necessary ensuring safe and effective administration of radiation. Ongoing therapeutic radiology simulation-aided field setting verification is ordered throughout course of therapy.\\nA high frequency ultrasound image was acquired today for two-dimensional evaluation of the tumor volume and response to treatment, in addition to geometric accuracy of field placement. US depth is 1.31mm, which is -0.05mm in difference from previous imaging. The field placement and ultrasound imaging, per fraction, is separate and distinct from the initial simulation, and is an important task in providing safe administration of superficial radiation therapy. Physician evaluation of the ultrasound tumor depth will be ongoing throughout the course of treatment and is deemed medically necessary in order to ensure the efficacy of treatment and any necessary changes. Today’s image was evaluated for determination of continuation of treatment with the current plan or with necessary changes as appropriate. According to provider review of verification therapeutic radiology simulation-aided field setting and imaging, no change is required. Additionally, the use of ultrasound visualization and targeted assessment allows the patient to be able to see their cancer(s) progress, encouraging patient to complete and maintain compliance through full course of radiotherapy.\\nPer Dr. Kristopher Finnegan, continued ultrasound guidance and therapeutic radiology simulation-aided field setting verification per fraction is required for field placement, measurement of tumor depth, progress and acute effect monitoring.

## 2021-02-09 NOTE — PROCEDURE: SUPERFICIAL RADIATION TREATMENT
Limb Positioning Or Elevation: Legs Extended
Total Number Of Fractions Rx 2: 3
Day Of The Week Treatment Administered: Tuesday
Dimensions-Y Axis In Cm: 1.5
Shielding Size (Optional- Include Units): 3.0x3.0
Field Size (Applicator): 4.0 cm
Bill For Simulation And Treatment Device Design: No
Custom Shielding Afterword Text Will Not Be Included With Simple Simulations (X X Y Cm............): port to correlate with the lesion size, including treatment margin. The custom lead shield is adequate to accommodate the appropriate applicator and provide adequate shielding around the treatment site. Additional shielding (as noted below) is used to protect sensitive, normal tissues.
Validate Note Data (See Information Below): Yes
Fractionation Number: 11
Dose Per Fractionation In Cgy (Optional): 230.01cGy
Field Number: 1
Fractions / Week Rx 3: 5
Cumulative Dose In Cgy (Optional): 2530.01
Functional Status: 0 (fully active)
Total Dose (Optional-Please Include Units): 5750.25cGy
Time Dose Fractionation (Optional- Include Units If Applicable): 93
Treatment Margins In Cm: 0.8
Pathology Override (Pathology Will Render As Diagnosis Name If Left Blank): Squamous Cell Carcinoma-Invasive
Depth (Optional-Please Include Units): 1.98
Treatment Time In Min (Optional): 0.33
Fractionation Number (Evaluation): 10
Energy (Include Units): 70kV
Comments: RTOG 1
Additional Prescription Justification Text: If there is any interruption in treatment exceeding 5 days please see Decay and Dose Adjustment Calculation and complete treatment under Prescription 2, 3 or 4 as appropriate.
Treatment Time / Fractionation (Optional- Include Units): 0.33min
Total Number Of Fractions Rx 4: 15
Simple Simulation Preamble Text Will Be Included With Simple Simulations (.......... Indications): Simple simulation was performed today for the following reasons:
Total Number Of Fractions: 25
Intro Statement (Will Not Render If Left Blank): The patient is undergoing superficial radiation therapy for skin cancer and presents for weekly evaluation and management.  Per protocol and as documented on the flow sheet, the patient was questioned as to subjective redness, pruritus, pain, drainage, fatigue, or any other symptoms.  Objectively, the radiation area was evaluated with regards to erythema, atrophy, scale, crusting, erosion, ulceration, edema, purpura, tenderness, warmth, drainage, and any other findings.  The plan was extensively reviewed including the dose, and dosing schedule.  The simulation and clinical setup was also reviewed as was the external and any internal shields and based on this review the appropriateness and sufficiency of treatment was determined.
Simple Simulation Afterword Text Will Be Included With Simple Simulations (Indications............): The patient had a complete consultation regarding all applicable modalities for the treatment of their lesion and based on a variety of factors including the type of lesion, size, and location, the relevant medical history as well as local tissue factors, the functional status of the individual, the ability to perform necessary postoperative wound instructions and the need for simultaneous treatments as well as overall wound healing status, it was determined that the patient would begin radiation therapy treatment for skin cancer.  A full simulation and treatment device design was performed including the determination and formulation of appropriate simple and complex devices including lead shield of 2.286 mm thickness to form molded customized shielding to specifically correlate with the lesion size including treatment margin.  The custom lead shield is adequate to accommodate the appropriate applicator and provide adequate shielding around the treatment site.  The specific field applicator, shields, and devices both simple and complex as well as the specific patient setup is outlined below.  The patient was given a full consent for superficial radiation to both verbally and in writing and the full determination of patient's eligibility for treatment and selection is outlined on the patient eligibility and treatment selection form.  The specific superficial radiotherapy prescription was determined and was documented on the superficial radiotherapy prescription form.  A treatment calculation was also performed and documented on the treatment calculation form.  Based on the prescription, the patient was scheduled for a series of fractional treatments.
Custom Shielding Preamble Text Will Not Be Included With Simple Simulations (.......... X X Y Cm): A lead shield of 0.762 mm thickness is utilized to form a molded, custom shield with a
Dose / Tx In Cgy (Optional): 230.01
Detail Level: Detailed
Treatment Device Design After Initial Simulation Justification (Will Render If Bill For Treatment Devices = Yes): The patient is status post radiation simulation and is evaluated as to the use of additional devices for shielding and placement for radiation therapy.
Information: Selecting Yes will display possible errors in your note based on the variables you have selected. This validation is only offered as a suggestion for you. PLEASE NOTE THAT THE VALIDATION TEXT WILL BE REMOVED WHEN YOU FINALIZE YOUR NOTE. IF YOU WANT TO FAX A PRELIMINARY NOTE YOU WILL NEED TO TOGGLE THIS TO 'NO' IF YOU DO NOT WANT IT IN YOUR FAXED NOTE.
Patient Positioning: Supine
Assessment: Appropriate reaction
Initial Radiation Treatment Planning (Will Render If Bill Simulation = Yes): The patient had a complete consultation regarding all applicable modalities for the treatment of their skin cancer and based on a variety of factors including the type of lesion, size, and location, the relevant medical history as well as local tissue factors, the functional status of the individual, the ability to perform necessary postoperative wound instructions and the need for simultaneous treatments as well as overall wound healing status, it was determined that the patient would begin radiation therapy treatment for skin cancer.  A full simulation and treatment device design was performed including the determination and formulation of appropriate simple and complex devices including lead shield of 0.762 mm thickness to form molded customized shielding to specifically correlate with the lesion size including treatment margin.  The custom lead shield is adequate to accommodate the appropriate applicator and provide adequate shielding around the treatment site.  The specific field applicator, shields, and devices both simple and complex as well as the specific patient setup is outlined below.  The patient was given a full consent for superficial radiation to both verbally and in writing and the full determination of patient's eligibility for treatment and selection is outlined on the patient eligibility and treatment selection form.  The specific superficial radiotherapy prescription was determined and was documented on the superficial radiotherapy prescription form.  A treatment calculation was also performed and documented on the treatment calculation form.  Based on the prescription, the patient was scheduled for a series of fractional treatments.

## 2021-02-10 ENCOUNTER — APPOINTMENT (OUTPATIENT)
Dept: URBAN - METROPOLITAN AREA CLINIC 273 | Age: 76
Setting detail: DERMATOLOGY
End: 2021-02-10

## 2021-02-10 PROBLEM — C44.729 SQUAMOUS CELL CARCINOMA OF SKIN OF LEFT LOWER LIMB, INCLUDING HIP: Status: ACTIVE | Noted: 2021-02-10

## 2021-02-10 PROCEDURE — OTHER TREATMENT REGIMEN: OTHER

## 2021-02-10 PROCEDURE — OTHER SUPERFICIAL RADIATION TREATMENT: OTHER

## 2021-02-10 PROCEDURE — 77280 THER RAD SIMULAJ FIELD SMPL: CPT

## 2021-02-10 PROCEDURE — G6001 ECHO GUIDANCE RADIOTHERAPY: HCPCS

## 2021-02-10 PROCEDURE — 77401 RADIATION TX DELIVERY SUPFC: CPT

## 2021-02-10 NOTE — PROCEDURE: TREATMENT REGIMEN
Plan: This patient has been treated today with image guided superficial radiation therapy for non-melanoma skin cancer.  Written informed consent has been previously obtained from this patient for this treatment. This consent is documented in the patient’s chart. The patient gave verbal consent to continue treatment today.\\nThe patient was treated with a specific radiation dose and setup as prescribed by the provider listed on this visit note. A Radiation Therapist performed administration of radiation under supervision of provider. The treatment parameters and cumulative dose are indicated above.\\nPrior to administering the radiation, the patient underwent a verification therapeutic radiology simulation-aided field setting defining relevant normal and abnormal target anatomy and acquiring images with high frequency ultrasound in addition to data necessary developing optimal radiation treatment process for the patient. This process includes verification of the treatment port(s) and proper treatment positioning. All treatment ports were photographed within electronic medical record. The patient’s customized lead blocking along with gross tumor volume and margin was confirmed. Considering superficial radiotherapy is clinical in setup, this requires physician and radiation therapist to clarify location interest being treated against initial images, pathology and patient anatomy. Care was taken ensuring fields treated were geometrically accurate and properly positioned using therapeutic radiology simulation-aided field setting verification per fraction. This process is also utilized to determine if any prescription or setup changes are necessary. These steps are therefore medically necessary ensuring safe and effective administration of radiation. Ongoing therapeutic radiology simulation-aided field setting verification is ordered throughout course of therapy.\\nA high frequency ultrasound image was acquired today for two-dimensional evaluation of the tumor volume and response to treatment, in addition to geometric accuracy of field placement. US depth is 1.44mm, which is +0.13mm in difference from previous imaging. The field placement and ultrasound imaging, per fraction, is separate and distinct from the initial simulation, and is an important task in providing safe administration of superficial radiation therapy. Physician evaluation of the ultrasound tumor depth will be ongoing throughout the course of treatment and is deemed medically necessary in order to ensure the efficacy of treatment and any necessary changes. Today’s image was evaluated for determination of continuation of treatment with the current plan or with necessary changes as appropriate. According to provider review of verification therapeutic radiology simulation-aided field setting and imaging, no change is required. Additionally, the use of ultrasound visualization and targeted assessment allows the patient to be able to see their cancer(s) progress, encouraging patient to complete and maintain compliance through full course of radiotherapy.\\nPer Dr. Kristopher Finnegan, continued ultrasound guidance and therapeutic radiology simulation-aided field setting verification per fraction is required for field placement, measurement of tumor depth, progress and acute effect monitoring.
Detail Level: Zone

## 2021-02-10 NOTE — PROCEDURE: SUPERFICIAL RADIATION TREATMENT
Pathology Override (Pathology Will Render As Diagnosis Name If Left Blank): Squamous Cell Carcinoma-Invasive
Simple Simulation Preamble Text Will Be Included With Simple Simulations (.......... Indications): Simple simulation was performed today for the following reasons:
Bill For Simulation And Treatment Device Design: No
Fractionation Number (Evaluation): 10
Shielding Size (Optional- Include Units): 3.0x3.0
Functional Status: 0 (fully active)
Time Dose Fractionation (Optional- Include Units If Applicable): 93
Dose Per Fractionation In Cgy (Optional): 230.01cGy
Information: Selecting Yes will display possible errors in your note based on the variables you have selected. This validation is only offered as a suggestion for you. PLEASE NOTE THAT THE VALIDATION TEXT WILL BE REMOVED WHEN YOU FINALIZE YOUR NOTE. IF YOU WANT TO FAX A PRELIMINARY NOTE YOU WILL NEED TO TOGGLE THIS TO 'NO' IF YOU DO NOT WANT IT IN YOUR FAXED NOTE.
Field Size (Applicator): 4.0 cm
Dimensions-Y Axis In Cm: 1.5
Total Number Of Fractions: 25
Fractions / Week Rx 3: 5
Validate Note Data (See Information Below): Yes
Detail Level: Detailed
Energy (Include Units): 70kV
Depth (Optional-Please Include Units): 1.98
Intro Statement (Will Not Render If Left Blank): The patient is undergoing superficial radiation therapy for skin cancer and presents for weekly evaluation and management.  Per protocol and as documented on the flow sheet, the patient was questioned as to subjective redness, pruritus, pain, drainage, fatigue, or any other symptoms.  Objectively, the radiation area was evaluated with regards to erythema, atrophy, scale, crusting, erosion, ulceration, edema, purpura, tenderness, warmth, drainage, and any other findings.  The plan was extensively reviewed including the dose, and dosing schedule.  The simulation and clinical setup was also reviewed as was the external and any internal shields and based on this review the appropriateness and sufficiency of treatment was determined.
Treatment Device Design After Initial Simulation Justification (Will Render If Bill For Treatment Devices = Yes): The patient is status post radiation simulation and is evaluated as to the use of additional devices for shielding and placement for radiation therapy.
Total Number Of Fractions Rx 3: 15
Number Of Treatment Days: 1
Custom Shielding Afterword Text Will Not Be Included With Simple Simulations (X X Y Cm............): port to correlate with the lesion size, including treatment margin. The custom lead shield is adequate to accommodate the appropriate applicator and provide adequate shielding around the treatment site. Additional shielding (as noted below) is used to protect sensitive, normal tissues.
Port Dimensions-X Axis In Cm: 3
Assessment: Appropriate reaction
Limb Positioning Or Elevation: Legs Extended
Computed Treatment Time In Min (Will Render The Same As Calculated Treatment Time If Left Blank): 0.33
Treatment Time / Fractionation (Optional- Include Units): 0.33min
Simple Simulation Afterword Text Will Be Included With Simple Simulations (Indications............): The patient had a complete consultation regarding all applicable modalities for the treatment of their lesion and based on a variety of factors including the type of lesion, size, and location, the relevant medical history as well as local tissue factors, the functional status of the individual, the ability to perform necessary postoperative wound instructions and the need for simultaneous treatments as well as overall wound healing status, it was determined that the patient would begin radiation therapy treatment for skin cancer.  A full simulation and treatment device design was performed including the determination and formulation of appropriate simple and complex devices including lead shield of 2.286 mm thickness to form molded customized shielding to specifically correlate with the lesion size including treatment margin.  The custom lead shield is adequate to accommodate the appropriate applicator and provide adequate shielding around the treatment site.  The specific field applicator, shields, and devices both simple and complex as well as the specific patient setup is outlined below.  The patient was given a full consent for superficial radiation to both verbally and in writing and the full determination of patient's eligibility for treatment and selection is outlined on the patient eligibility and treatment selection form.  The specific superficial radiotherapy prescription was determined and was documented on the superficial radiotherapy prescription form.  A treatment calculation was also performed and documented on the treatment calculation form.  Based on the prescription, the patient was scheduled for a series of fractional treatments.
Fractionation Number: 12
Day Of The Week Treatment Administered: Wednesday
Patient Positioning: Supine
Comments: RTOG 1
Dose / Tx In Cgy (Optional): 230.01
Treatment Margins In Cm: 0.8
Total Dose (Optional-Please Include Units): 5750.25cGy
Initial Radiation Treatment Planning (Will Render If Bill Simulation = Yes): The patient had a complete consultation regarding all applicable modalities for the treatment of their skin cancer and based on a variety of factors including the type of lesion, size, and location, the relevant medical history as well as local tissue factors, the functional status of the individual, the ability to perform necessary postoperative wound instructions and the need for simultaneous treatments as well as overall wound healing status, it was determined that the patient would begin radiation therapy treatment for skin cancer.  A full simulation and treatment device design was performed including the determination and formulation of appropriate simple and complex devices including lead shield of 0.762 mm thickness to form molded customized shielding to specifically correlate with the lesion size including treatment margin.  The custom lead shield is adequate to accommodate the appropriate applicator and provide adequate shielding around the treatment site.  The specific field applicator, shields, and devices both simple and complex as well as the specific patient setup is outlined below.  The patient was given a full consent for superficial radiation to both verbally and in writing and the full determination of patient's eligibility for treatment and selection is outlined on the patient eligibility and treatment selection form.  The specific superficial radiotherapy prescription was determined and was documented on the superficial radiotherapy prescription form.  A treatment calculation was also performed and documented on the treatment calculation form.  Based on the prescription, the patient was scheduled for a series of fractional treatments.
Custom Shielding Preamble Text Will Not Be Included With Simple Simulations (.......... X X Y Cm): A lead shield of 0.762 mm thickness is utilized to form a molded, custom shield with a
Cumulative Dose In Cgy (Optional): 2760.12
Additional Prescription Justification Text: If there is any interruption in treatment exceeding 5 days please see Decay and Dose Adjustment Calculation and complete treatment under Prescription 2, 3 or 4 as appropriate.

## 2021-02-14 ENCOUNTER — APPOINTMENT (OUTPATIENT)
Dept: URBAN - METROPOLITAN AREA CLINIC 273 | Age: 76
Setting detail: DERMATOLOGY
End: 2021-02-24

## 2021-02-14 PROCEDURE — 77336 RADIATION PHYSICS CONSULT: CPT

## 2021-02-22 ENCOUNTER — APPOINTMENT (OUTPATIENT)
Dept: URBAN - METROPOLITAN AREA CLINIC 273 | Age: 76
Setting detail: DERMATOLOGY
End: 2021-02-22

## 2021-02-22 PROBLEM — C44.729 SQUAMOUS CELL CARCINOMA OF SKIN OF LEFT LOWER LIMB, INCLUDING HIP: Status: ACTIVE | Noted: 2021-02-22

## 2021-02-22 PROCEDURE — 77401 RADIATION TX DELIVERY SUPFC: CPT

## 2021-02-22 PROCEDURE — G6001 ECHO GUIDANCE RADIOTHERAPY: HCPCS

## 2021-02-22 PROCEDURE — 77280 THER RAD SIMULAJ FIELD SMPL: CPT

## 2021-02-22 PROCEDURE — OTHER TREATMENT REGIMEN: OTHER

## 2021-02-22 PROCEDURE — OTHER SUPERFICIAL RADIATION TREATMENT: OTHER

## 2021-02-22 NOTE — PROCEDURE: TREATMENT REGIMEN
Detail Level: Zone
Plan: This patient has been treated today with image guided superficial radiation therapy for non-melanoma skin cancer.  Written informed consent has been previously obtained from this patient for this treatment. This consent is documented in the patient’s chart. The patient gave verbal consent to continue treatment today.\\nThe patient was treated with a specific radiation dose and setup as prescribed by the provider listed on this visit note. A Radiation Therapist performed administration of radiation under supervision of provider. The treatment parameters and cumulative dose are indicated above.\\nPrior to administering the radiation, the patient underwent a verification therapeutic radiology simulation-aided field setting defining relevant normal and abnormal target anatomy and acquiring images with high frequency ultrasound in addition to data necessary developing optimal radiation treatment process for the patient. This process includes verification of the treatment port(s) and proper treatment positioning. All treatment ports were photographed within electronic medical record. The patient’s customized lead blocking along with gross tumor volume and margin was confirmed. Considering superficial radiotherapy is clinical in setup, this requires physician and radiation therapist to clarify location interest being treated against initial images, pathology and patient anatomy. Care was taken ensuring fields treated were geometrically accurate and properly positioned using therapeutic radiology simulation-aided field setting verification per fraction. This process is also utilized to determine if any prescription or setup changes are necessary. These steps are therefore medically necessary ensuring safe and effective administration of radiation. Ongoing therapeutic radiology simulation-aided field setting verification is ordered throughout course of therapy.\\nA high frequency ultrasound image was acquired today for two-dimensional evaluation of the tumor volume and response to treatment, in addition to geometric accuracy of field placement. US depth is 2.15mm, which is +0.69mm in difference from previous imaging. The field placement and ultrasound imaging, per fraction, is separate and distinct from the initial simulation, and is an important task in providing safe administration of superficial radiation therapy. Physician evaluation of the ultrasound tumor depth will be ongoing throughout the course of treatment and is deemed medically necessary in order to ensure the efficacy of treatment and any necessary changes. Today’s image was evaluated for determination of continuation of treatment with the current plan or with necessary changes as appropriate. According to provider review of verification therapeutic radiology simulation-aided field setting and imaging, no change is required. Additionally, the use of ultrasound visualization and targeted assessment allows the patient to be able to see their cancer(s) progress, encouraging patient to complete and maintain compliance through full course of radiotherapy.\\nPer Dr. Kristopher Finnegan, continued ultrasound guidance and therapeutic radiology simulation-aided field setting verification per fraction is required for field placement, measurement of tumor depth, progress and acute effect monitoring.

## 2021-02-23 ENCOUNTER — APPOINTMENT (OUTPATIENT)
Dept: URBAN - METROPOLITAN AREA CLINIC 273 | Age: 76
Setting detail: DERMATOLOGY
End: 2021-02-23

## 2021-02-23 PROBLEM — C44.729 SQUAMOUS CELL CARCINOMA OF SKIN OF LEFT LOWER LIMB, INCLUDING HIP: Status: ACTIVE | Noted: 2021-02-23

## 2021-02-23 PROCEDURE — OTHER SUPERFICIAL RADIATION TREATMENT: OTHER

## 2021-02-23 PROCEDURE — 77401 RADIATION TX DELIVERY SUPFC: CPT

## 2021-02-23 PROCEDURE — G6001 ECHO GUIDANCE RADIOTHERAPY: HCPCS

## 2021-02-23 PROCEDURE — OTHER TREATMENT REGIMEN: OTHER

## 2021-02-23 PROCEDURE — 77280 THER RAD SIMULAJ FIELD SMPL: CPT

## 2021-02-23 NOTE — PROCEDURE: SUPERFICIAL RADIATION TREATMENT
Fractions / Week Rx 3: 5
Fractions / Week: 3
Field Size (Applicator): 4.0 cm
Cumulative Dose In Cgy (Optional): 3220.14
Energy (Optional-Please Include Units): 70kV
Comments: RTOG 1
Assessment: Appropriate reaction
Detail Level: Detailed
Fractionation Number: 14
Custom Shielding Preamble Text Will Not Be Included With Simple Simulations (.......... X X Y Cm): A lead shield of 0.762 mm thickness is utilized to form a molded, custom shield with a
Treatment Time / Fractionation (Optional- Include Units): 0.33min
Simple Simulation Preamble Text Will Be Included With Simple Simulations (.......... Indications): Simple simulation was performed today for the following reasons:
Computed Treatment Time In Min (Will Render The Same As Calculated Treatment Time If Left Blank): 0.33
Dimensions-X Axis In Cm: 1.5
Intro Statement (Will Not Render If Left Blank): The patient is undergoing superficial radiation therapy for skin cancer and presents for weekly evaluation and management.  Per protocol and as documented on the flow sheet, the patient was questioned as to subjective redness, pruritus, pain, drainage, fatigue, or any other symptoms.  Objectively, the radiation area was evaluated with regards to erythema, atrophy, scale, crusting, erosion, ulceration, edema, purpura, tenderness, warmth, drainage, and any other findings.  The plan was extensively reviewed including the dose, and dosing schedule.  The simulation and clinical setup was also reviewed as was the external and any internal shields and based on this review the appropriateness and sufficiency of treatment was determined.
Time Dose Fractionation (Optional- Include Units If Applicable): 93
Fractionation Number (Evaluation): 10
Render Additional Prescriptions In Note?: No
Information: Selecting Yes will display possible errors in your note based on the variables you have selected. This validation is only offered as a suggestion for you. PLEASE NOTE THAT THE VALIDATION TEXT WILL BE REMOVED WHEN YOU FINALIZE YOUR NOTE. IF YOU WANT TO FAX A PRELIMINARY NOTE YOU WILL NEED TO TOGGLE THIS TO 'NO' IF YOU DO NOT WANT IT IN YOUR FAXED NOTE.
Custom Shielding Afterword Text Will Not Be Included With Simple Simulations (X X Y Cm............): port to correlate with the lesion size, including treatment margin. The custom lead shield is adequate to accommodate the appropriate applicator and provide adequate shielding around the treatment site. Additional shielding (as noted below) is used to protect sensitive, normal tissues.
Dose Per Fractionation In Cgy (Optional): 230.01cGy
Field Number: 1
Total Number Of Fractions Rx 3: 15
Simple Simulation Afterword Text Will Be Included With Simple Simulations (Indications............): The patient had a complete consultation regarding all applicable modalities for the treatment of their lesion and based on a variety of factors including the type of lesion, size, and location, the relevant medical history as well as local tissue factors, the functional status of the individual, the ability to perform necessary postoperative wound instructions and the need for simultaneous treatments as well as overall wound healing status, it was determined that the patient would begin radiation therapy treatment for skin cancer.  A full simulation and treatment device design was performed including the determination and formulation of appropriate simple and complex devices including lead shield of 2.286 mm thickness to form molded customized shielding to specifically correlate with the lesion size including treatment margin.  The custom lead shield is adequate to accommodate the appropriate applicator and provide adequate shielding around the treatment site.  The specific field applicator, shields, and devices both simple and complex as well as the specific patient setup is outlined below.  The patient was given a full consent for superficial radiation to both verbally and in writing and the full determination of patient's eligibility for treatment and selection is outlined on the patient eligibility and treatment selection form.  The specific superficial radiotherapy prescription was determined and was documented on the superficial radiotherapy prescription form.  A treatment calculation was also performed and documented on the treatment calculation form.  Based on the prescription, the patient was scheduled for a series of fractional treatments.
Day Of The Week Treatment Administered: Tuesday
Total Number Of Fractions: 25
Pathology Override (Pathology Will Render As Diagnosis Name If Left Blank): Squamous Cell Carcinoma-Invasive
Treatment Margins In Cm: 0.8
Initial Radiation Treatment Planning (Will Render If Bill Simulation = Yes): The patient had a complete consultation regarding all applicable modalities for the treatment of their skin cancer and based on a variety of factors including the type of lesion, size, and location, the relevant medical history as well as local tissue factors, the functional status of the individual, the ability to perform necessary postoperative wound instructions and the need for simultaneous treatments as well as overall wound healing status, it was determined that the patient would begin radiation therapy treatment for skin cancer.  A full simulation and treatment device design was performed including the determination and formulation of appropriate simple and complex devices including lead shield of 0.762 mm thickness to form molded customized shielding to specifically correlate with the lesion size including treatment margin.  The custom lead shield is adequate to accommodate the appropriate applicator and provide adequate shielding around the treatment site.  The specific field applicator, shields, and devices both simple and complex as well as the specific patient setup is outlined below.  The patient was given a full consent for superficial radiation to both verbally and in writing and the full determination of patient's eligibility for treatment and selection is outlined on the patient eligibility and treatment selection form.  The specific superficial radiotherapy prescription was determined and was documented on the superficial radiotherapy prescription form.  A treatment calculation was also performed and documented on the treatment calculation form.  Based on the prescription, the patient was scheduled for a series of fractional treatments.
Validate Note Data (See Information Below): Yes
Total Dose (Optional-Please Include Units): 5750.25cGy
Treatment Device Design After Initial Simulation Justification (Will Render If Bill For Treatment Devices = Yes): The patient is status post radiation simulation and is evaluated as to the use of additional devices for shielding and placement for radiation therapy.
Limb Positioning Or Elevation: Legs Extended
Depth (Optional-Please Include Units): 1.98
Dose / Tx In Cgy (Optional): 230.01
Additional Prescription Justification Text: If there is any interruption in treatment exceeding 5 days please see Decay and Dose Adjustment Calculation and complete treatment under Prescription 2, 3 or 4 as appropriate.
Functional Status: 0 (fully active)
Shielding Size (Optional- Include Units): 3.0x3.0
Patient Positioning: Supine

## 2021-02-23 NOTE — PROCEDURE: TREATMENT REGIMEN
Detail Level: Zone
Plan: This patient has been treated today with image guided superficial radiation therapy for non-melanoma skin cancer.  Written informed consent has been previously obtained from this patient for this treatment. This consent is documented in the patient’s chart. The patient gave verbal consent to continue treatment today.\\nThe patient was treated with a specific radiation dose and setup as prescribed by the provider listed on this visit note. A Radiation Therapist performed administration of radiation under supervision of provider. The treatment parameters and cumulative dose are indicated above.\\nPrior to administering the radiation, the patient underwent a verification therapeutic radiology simulation-aided field setting defining relevant normal and abnormal target anatomy and acquiring images with high frequency ultrasound in addition to data necessary developing optimal radiation treatment process for the patient. This process includes verification of the treatment port(s) and proper treatment positioning. All treatment ports were photographed within electronic medical record. The patient’s customized lead blocking along with gross tumor volume and margin was confirmed. Considering superficial radiotherapy is clinical in setup, this requires physician and radiation therapist to clarify location interest being treated against initial images, pathology and patient anatomy. Care was taken ensuring fields treated were geometrically accurate and properly positioned using therapeutic radiology simulation-aided field setting verification per fraction. This process is also utilized to determine if any prescription or setup changes are necessary. These steps are therefore medically necessary ensuring safe and effective administration of radiation. Ongoing therapeutic radiology simulation-aided field setting verification is ordered throughout course of therapy.\\nA high frequency ultrasound image was acquired today for two-dimensional evaluation of the tumor volume and response to treatment, in addition to geometric accuracy of field placement. US depth is 2.21mm, which is +0.06mm in difference from previous imaging. The field placement and ultrasound imaging, per fraction, is separate and distinct from the initial simulation, and is an important task in providing safe administration of superficial radiation therapy. Physician evaluation of the ultrasound tumor depth will be ongoing throughout the course of treatment and is deemed medically necessary in order to ensure the efficacy of treatment and any necessary changes. Today’s image was evaluated for determination of continuation of treatment with the current plan or with necessary changes as appropriate. According to provider review of verification therapeutic radiology simulation-aided field setting and imaging, no change is required. Additionally, the use of ultrasound visualization and targeted assessment allows the patient to be able to see their cancer(s) progress, encouraging patient to complete and maintain compliance through full course of radiotherapy.\\nPer Dr. Kristopher Finnegan, continued ultrasound guidance and therapeutic radiology simulation-aided field setting verification per fraction is required for field placement, measurement of tumor depth, progress and acute effect monitoring.

## 2021-02-25 ENCOUNTER — APPOINTMENT (OUTPATIENT)
Dept: URBAN - METROPOLITAN AREA CLINIC 273 | Age: 76
Setting detail: DERMATOLOGY
End: 2021-02-25

## 2021-02-25 PROBLEM — C44.729 SQUAMOUS CELL CARCINOMA OF SKIN OF LEFT LOWER LIMB, INCLUDING HIP: Status: ACTIVE | Noted: 2021-02-25

## 2021-02-25 PROCEDURE — 77427 RADIATION TX MANAGEMENT X5: CPT

## 2021-02-25 PROCEDURE — G6001 ECHO GUIDANCE RADIOTHERAPY: HCPCS

## 2021-02-25 PROCEDURE — 77401 RADIATION TX DELIVERY SUPFC: CPT

## 2021-02-25 PROCEDURE — OTHER TREATMENT REGIMEN: OTHER

## 2021-02-25 PROCEDURE — OTHER SUPERFICIAL RADIATION TREATMENT: OTHER

## 2021-02-25 PROCEDURE — 77280 THER RAD SIMULAJ FIELD SMPL: CPT

## 2021-02-25 NOTE — PROCEDURE: SUPERFICIAL RADIATION TREATMENT
Render Additional Prescriptions In Note?: No
Assessment: Appropriate reaction
Energy (Optional-Please Include Units): 70kV
Treatment Time / Fractionation (Optional- Include Units): 0.33min
Comments: RTOG 1
Number Of Treatment Days: 1
Total Number Of Fractions Rx 3: 15
Fractions / Week Rx 3: 5
Functional Status: 0 (fully active)
Information: Selecting Yes will display possible errors in your note based on the variables you have selected. This validation is only offered as a suggestion for you. PLEASE NOTE THAT THE VALIDATION TEXT WILL BE REMOVED WHEN YOU FINALIZE YOUR NOTE. IF YOU WANT TO FAX A PRELIMINARY NOTE YOU WILL NEED TO TOGGLE THIS TO 'NO' IF YOU DO NOT WANT IT IN YOUR FAXED NOTE.
Fractions / Week: 3
Validate Note Data (See Information Below): Yes
Dimensions-Y Axis In Cm: 1.5
Treatment Device Design After Initial Simulation Justification (Will Render If Bill For Treatment Devices = Yes): The patient is status post radiation simulation and is evaluated as to the use of additional devices for shielding and placement for radiation therapy.
Additional Prescription Justification Text: If there is any interruption in treatment exceeding 5 days please see Decay and Dose Adjustment Calculation and complete treatment under Prescription 2, 3 or 4 as appropriate.
Custom Shielding Afterword Text Will Not Be Included With Simple Simulations (X X Y Cm............): port to correlate with the lesion size, including treatment margin. The custom lead shield is adequate to accommodate the appropriate applicator and provide adequate shielding around the treatment site. Additional shielding (as noted below) is used to protect sensitive, normal tissues.
Cumulative Dose In Cgy (Optional): 3450.15
Treatment Time In Min (Optional): 0.33
Custom Shielding Preamble Text Will Not Be Included With Simple Simulations (.......... X X Y Cm): A lead shield of 0.762 mm thickness is utilized to form a molded, custom shield with a
Patient Positioning: Supine
Total Dose (Optional-Please Include Units): 5750.25cGy
Daily Fractionated Dose (Optional- Include Units): 230.01cGy
Pathology Override (Pathology Will Render As Diagnosis Name If Left Blank): Squamous Cell Carcinoma-Invasive
Initial Radiation Treatment Planning (Will Render If Bill Simulation = Yes): The patient had a complete consultation regarding all applicable modalities for the treatment of their skin cancer and based on a variety of factors including the type of lesion, size, and location, the relevant medical history as well as local tissue factors, the functional status of the individual, the ability to perform necessary postoperative wound instructions and the need for simultaneous treatments as well as overall wound healing status, it was determined that the patient would begin radiation therapy treatment for skin cancer.  A full simulation and treatment device design was performed including the determination and formulation of appropriate simple and complex devices including lead shield of 0.762 mm thickness to form molded customized shielding to specifically correlate with the lesion size including treatment margin.  The custom lead shield is adequate to accommodate the appropriate applicator and provide adequate shielding around the treatment site.  The specific field applicator, shields, and devices both simple and complex as well as the specific patient setup is outlined below.  The patient was given a full consent for superficial radiation to both verbally and in writing and the full determination of patient's eligibility for treatment and selection is outlined on the patient eligibility and treatment selection form.  The specific superficial radiotherapy prescription was determined and was documented on the superficial radiotherapy prescription form.  A treatment calculation was also performed and documented on the treatment calculation form.  Based on the prescription, the patient was scheduled for a series of fractional treatments.
Time Dose Fractionation (Optional- Include Units If Applicable): 93
Simple Simulation Afterword Text Will Be Included With Simple Simulations (Indications............): The patient had a complete consultation regarding all applicable modalities for the treatment of their lesion and based on a variety of factors including the type of lesion, size, and location, the relevant medical history as well as local tissue factors, the functional status of the individual, the ability to perform necessary postoperative wound instructions and the need for simultaneous treatments as well as overall wound healing status, it was determined that the patient would begin radiation therapy treatment for skin cancer.  A full simulation and treatment device design was performed including the determination and formulation of appropriate simple and complex devices including lead shield of 2.286 mm thickness to form molded customized shielding to specifically correlate with the lesion size including treatment margin.  The custom lead shield is adequate to accommodate the appropriate applicator and provide adequate shielding around the treatment site.  The specific field applicator, shields, and devices both simple and complex as well as the specific patient setup is outlined below.  The patient was given a full consent for superficial radiation to both verbally and in writing and the full determination of patient's eligibility for treatment and selection is outlined on the patient eligibility and treatment selection form.  The specific superficial radiotherapy prescription was determined and was documented on the superficial radiotherapy prescription form.  A treatment calculation was also performed and documented on the treatment calculation form.  Based on the prescription, the patient was scheduled for a series of fractional treatments.
Treatment Margins In Cm: 0.8
Intro Statement (Will Not Render If Left Blank): The patient is undergoing superficial radiation therapy for skin cancer and presents for weekly evaluation and management.  Per protocol and as documented on the flow sheet, the patient was questioned as to subjective redness, pruritus, pain, drainage, fatigue, or any other symptoms.  Objectively, the radiation area was evaluated with regards to erythema, atrophy, scale, crusting, erosion, ulceration, edema, purpura, tenderness, warmth, drainage, and any other findings.  The plan was extensively reviewed including the dose, and dosing schedule.  The simulation and clinical setup was also reviewed as was the external and any internal shields and based on this review the appropriateness and sufficiency of treatment was determined.
Dose / Tx In Cgy (Optional): 230.01
Detail Level: Detailed
Limb Positioning Or Elevation: Legs Extended
Total Number Of Fractions: 25
Shielding Size (Optional- Include Units): 3.0x3.0
Field Size (Applicator): 4.0 cm
Depth (Optional-Please Include Units): 1.98
Simple Simulation Preamble Text Will Be Included With Simple Simulations (.......... Indications): Simple simulation was performed today for the following reasons:
Day Of The Week Treatment Administered: Thursday

## 2021-02-25 NOTE — PROCEDURE: TREATMENT REGIMEN
Plan: This patient has been treated today with image guided superficial radiation therapy for non-melanoma skin cancer.  Written informed consent has been previously obtained from this patient for this treatment. This consent is documented in the patient’s chart. The patient gave verbal consent to continue treatment today.\\nThe patient was treated with a specific radiation dose and setup as prescribed by the provider listed on this visit note. A Radiation Therapist performed administration of radiation under supervision of provider. The treatment parameters and cumulative dose are indicated above.\\nPrior to administering the radiation, the patient underwent a verification therapeutic radiology simulation-aided field setting defining relevant normal and abnormal target anatomy and acquiring images with high frequency ultrasound in addition to data necessary developing optimal radiation treatment process for the patient. This process includes verification of the treatment port(s) and proper treatment positioning. All treatment ports were photographed within electronic medical record. The patient’s customized lead blocking along with gross tumor volume and margin was confirmed. Considering superficial radiotherapy is clinical in setup, this requires physician and radiation therapist to clarify location interest being treated against initial images, pathology and patient anatomy. Care was taken ensuring fields treated were geometrically accurate and properly positioned using therapeutic radiology simulation-aided field setting verification per fraction. This process is also utilized to determine if any prescription or setup changes are necessary. These steps are therefore medically necessary ensuring safe and effective administration of radiation. Ongoing therapeutic radiology simulation-aided field setting verification is ordered throughout course of therapy.\\nA high frequency ultrasound image was acquired today for two-dimensional evaluation of the tumor volume and response to treatment, in addition to geometric accuracy of field placement. US depth is 1.58mm, which is -0.63mm in difference from previous imaging. The field placement and ultrasound imaging, per fraction, is separate and distinct from the initial simulation, and is an important task in providing safe administration of superficial radiation therapy. Physician evaluation of the ultrasound tumor depth will be ongoing throughout the course of treatment and is deemed medically necessary in order to ensure the efficacy of treatment and any necessary changes. Today’s image was evaluated for determination of continuation of treatment with the current plan or with necessary changes as appropriate. According to provider review of verification therapeutic radiology simulation-aided field setting and imaging, no change is required. Additionally, the use of ultrasound visualization and targeted assessment allows the patient to be able to see their cancer(s) progress, encouraging patient to complete and maintain compliance through full course of radiotherapy.\\nPer Dr. Kristopher Finnegan, continued ultrasound guidance and therapeutic radiology simulation-aided field setting verification per fraction is required for field placement, measurement of tumor depth, progress and acute effect monitoring.
Detail Level: Zone

## 2021-02-26 ENCOUNTER — APPOINTMENT (OUTPATIENT)
Dept: URBAN - METROPOLITAN AREA CLINIC 273 | Age: 76
Setting detail: DERMATOLOGY
End: 2021-02-27

## 2021-02-26 PROBLEM — C44.729 SQUAMOUS CELL CARCINOMA OF SKIN OF LEFT LOWER LIMB, INCLUDING HIP: Status: ACTIVE | Noted: 2021-02-26

## 2021-02-26 PROCEDURE — 77401 RADIATION TX DELIVERY SUPFC: CPT

## 2021-02-26 PROCEDURE — OTHER SUPERFICIAL RADIATION TREATMENT: OTHER

## 2021-02-26 PROCEDURE — G6001 ECHO GUIDANCE RADIOTHERAPY: HCPCS

## 2021-02-26 PROCEDURE — OTHER TREATMENT REGIMEN: OTHER

## 2021-02-26 PROCEDURE — 77280 THER RAD SIMULAJ FIELD SMPL: CPT

## 2021-02-26 NOTE — PROCEDURE: TREATMENT REGIMEN
Detail Level: Zone
Plan: This patient has been treated today with image guided superficial radiation therapy for non-melanoma skin cancer.  Written informed consent has been previously obtained from this patient for this treatment. This consent is documented in the patient’s chart. The patient gave verbal consent to continue treatment today.\\nThe patient was treated with a specific radiation dose and setup as prescribed by the provider listed on this visit note. A Radiation Therapist performed administration of radiation under supervision of provider. The treatment parameters and cumulative dose are indicated above.\\nPrior to administering the radiation, the patient underwent a verification therapeutic radiology simulation-aided field setting defining relevant normal and abnormal target anatomy and acquiring images with high frequency ultrasound in addition to data necessary developing optimal radiation treatment process for the patient. This process includes verification of the treatment port(s) and proper treatment positioning. All treatment ports were photographed within electronic medical record. The patient’s customized lead blocking along with gross tumor volume and margin was confirmed. Considering superficial radiotherapy is clinical in setup, this requires physician and radiation therapist to clarify location interest being treated against initial images, pathology and patient anatomy. Care was taken ensuring fields treated were geometrically accurate and properly positioned using therapeutic radiology simulation-aided field setting verification per fraction. This process is also utilized to determine if any prescription or setup changes are necessary. These steps are therefore medically necessary ensuring safe and effective administration of radiation. Ongoing therapeutic radiology simulation-aided field setting verification is ordered throughout course of therapy.\\nA high frequency ultrasound image was acquired today for two-dimensional evaluation of the tumor volume and response to treatment, in addition to geometric accuracy of field placement. US depth is 2.12mm, which is +0.54mm in difference from previous imaging. The field placement and ultrasound imaging, per fraction, is separate and distinct from the initial simulation, and is an important task in providing safe administration of superficial radiation therapy. Physician evaluation of the ultrasound tumor depth will be ongoing throughout the course of treatment and is deemed medically necessary in order to ensure the efficacy of treatment and any necessary changes. Today’s image was evaluated for determination of continuation of treatment with the current plan or with necessary changes as appropriate. According to provider review of verification therapeutic radiology simulation-aided field setting and imaging, no change is required. Additionally, the use of ultrasound visualization and targeted assessment allows the patient to be able to see their cancer(s) progress, encouraging patient to complete and maintain compliance through full course of radiotherapy.\\nPer Dr. Kristopher Finnegan, continued ultrasound guidance and therapeutic radiology simulation-aided field setting verification per fraction is required for field placement, measurement of tumor depth, progress and acute effect monitoring.

## 2021-03-01 ENCOUNTER — APPOINTMENT (OUTPATIENT)
Dept: URBAN - METROPOLITAN AREA CLINIC 273 | Age: 76
Setting detail: DERMATOLOGY
End: 2021-03-01

## 2021-03-01 PROBLEM — C44.729 SQUAMOUS CELL CARCINOMA OF SKIN OF LEFT LOWER LIMB, INCLUDING HIP: Status: ACTIVE | Noted: 2021-03-01

## 2021-03-01 PROCEDURE — 77401 RADIATION TX DELIVERY SUPFC: CPT

## 2021-03-01 PROCEDURE — G6001 ECHO GUIDANCE RADIOTHERAPY: HCPCS

## 2021-03-01 PROCEDURE — 77280 THER RAD SIMULAJ FIELD SMPL: CPT

## 2021-03-01 PROCEDURE — OTHER SUPERFICIAL RADIATION TREATMENT: OTHER

## 2021-03-01 PROCEDURE — OTHER TREATMENT REGIMEN: OTHER

## 2021-03-01 NOTE — PROCEDURE: TREATMENT REGIMEN
Detail Level: Zone
Plan: This patient has been treated today with image guided superficial radiation therapy for non-melanoma skin cancer.  Written informed consent has been previously obtained from this patient for this treatment. This consent is documented in the patient’s chart. The patient gave verbal consent to continue treatment today.\\nThe patient was treated with a specific radiation dose and setup as prescribed by the provider listed on this visit note. A Radiation Therapist performed administration of radiation under supervision of provider. The treatment parameters and cumulative dose are indicated above.\\nPrior to administering the radiation, the patient underwent a verification therapeutic radiology simulation-aided field setting defining relevant normal and abnormal target anatomy and acquiring images with high frequency ultrasound in addition to data necessary developing optimal radiation treatment process for the patient. This process includes verification of the treatment port(s) and proper treatment positioning. All treatment ports were photographed within electronic medical record. The patient’s customized lead blocking along with gross tumor volume and margin was confirmed. Considering superficial radiotherapy is clinical in setup, this requires physician and radiation therapist to clarify location interest being treated against initial images, pathology and patient anatomy. Care was taken ensuring fields treated were geometrically accurate and properly positioned using therapeutic radiology simulation-aided field setting verification per fraction. This process is also utilized to determine if any prescription or setup changes are necessary. These steps are therefore medically necessary ensuring safe and effective administration of radiation. Ongoing therapeutic radiology simulation-aided field setting verification is ordered throughout course of therapy.\\nA high frequency ultrasound image was acquired today for two-dimensional evaluation of the tumor volume and response to treatment, in addition to geometric accuracy of field placement. US depth is 1.88mm, which is -0.24mm in difference from previous imaging. The field placement and ultrasound imaging, per fraction, is separate and distinct from the initial simulation, and is an important task in providing safe administration of superficial radiation therapy. Physician evaluation of the ultrasound tumor depth will be ongoing throughout the course of treatment and is deemed medically necessary in order to ensure the efficacy of treatment and any necessary changes. Today’s image was evaluated for determination of continuation of treatment with the current plan or with necessary changes as appropriate. According to provider review of verification therapeutic radiology simulation-aided field setting and imaging, no change is required. Additionally, the use of ultrasound visualization and targeted assessment allows the patient to be able to see their cancer(s) progress, encouraging patient to complete and maintain compliance through full course of radiotherapy.\\nPer Dr. Kristopher Finnegan, continued ultrasound guidance and therapeutic radiology simulation-aided field setting verification per fraction is required for field placement, measurement of tumor depth, progress and acute effect monitoring.

## 2021-03-01 NOTE — PROCEDURE: SUPERFICIAL RADIATION TREATMENT
Bill For Treatment Devices Only: No
Fractions / Week Rx 4: 5
Bill For Radiation Treatment: Yes
Custom Shielding Preamble Text Will Not Be Included With Simple Simulations (.......... X X Y Cm): A lead shield of 0.762 mm thickness is utilized to form a molded, custom shield with a
Port Dimensions-X Axis In Cm: 3
Dimensions-X Axis In Cm: 1.5
Field Size (Applicator): 4.0 cm
Dose / Tx In Cgy (Optional): 230.01
Daily Fractionated Dose (Optional- Include Units): 230.01cGy
Comments: RTOG 1
Fractionation Number: 17
Shielding Size (Optional- Include Units): 3.0x3.0
Number Of Treatment Days: 1
Total Number Of Fractions Rx 3: 15
Treatment Time / Fractionation (Optional- Include Units): 0.33min
Patient Positioning: Supine
Pathology Override (Pathology Will Render As Diagnosis Name If Left Blank): Squamous Cell Carcinoma-Invasive
Energy (Optional-Please Include Units): 70kV
Treatment Device Design After Initial Simulation Justification (Will Render If Bill For Treatment Devices = Yes): The patient is status post radiation simulation and is evaluated as to the use of additional devices for shielding and placement for radiation therapy.
Limb Positioning Or Elevation: Legs Extended
Simple Simulation Preamble Text Will Be Included With Simple Simulations (.......... Indications): Simple simulation was performed today for the following reasons:
Day Of The Week Treatment Administered: Monday
Detail Level: Detailed
Total Dose (Optional-Please Include Units): 5750.25cGy
Simple Simulation Afterword Text Will Be Included With Simple Simulations (Indications............): The patient had a complete consultation regarding all applicable modalities for the treatment of their lesion and based on a variety of factors including the type of lesion, size, and location, the relevant medical history as well as local tissue factors, the functional status of the individual, the ability to perform necessary postoperative wound instructions and the need for simultaneous treatments as well as overall wound healing status, it was determined that the patient would begin radiation therapy treatment for skin cancer.  A full simulation and treatment device design was performed including the determination and formulation of appropriate simple and complex devices including lead shield of 2.286 mm thickness to form molded customized shielding to specifically correlate with the lesion size including treatment margin.  The custom lead shield is adequate to accommodate the appropriate applicator and provide adequate shielding around the treatment site.  The specific field applicator, shields, and devices both simple and complex as well as the specific patient setup is outlined below.  The patient was given a full consent for superficial radiation to both verbally and in writing and the full determination of patient's eligibility for treatment and selection is outlined on the patient eligibility and treatment selection form.  The specific superficial radiotherapy prescription was determined and was documented on the superficial radiotherapy prescription form.  A treatment calculation was also performed and documented on the treatment calculation form.  Based on the prescription, the patient was scheduled for a series of fractional treatments.
Cumulative Dose In Cgy (Optional): 3910.17
Functional Status: 0 (fully active)
Custom Shielding Afterword Text Will Not Be Included With Simple Simulations (X X Y Cm............): port to correlate with the lesion size, including treatment margin. The custom lead shield is adequate to accommodate the appropriate applicator and provide adequate shielding around the treatment site. Additional shielding (as noted below) is used to protect sensitive, normal tissues.
Assessment: Appropriate reaction
Treatment Margins In Cm: 0.8
Information: Selecting Yes will display possible errors in your note based on the variables you have selected. This validation is only offered as a suggestion for you. PLEASE NOTE THAT THE VALIDATION TEXT WILL BE REMOVED WHEN YOU FINALIZE YOUR NOTE. IF YOU WANT TO FAX A PRELIMINARY NOTE YOU WILL NEED TO TOGGLE THIS TO 'NO' IF YOU DO NOT WANT IT IN YOUR FAXED NOTE.
Computed Treatment Time In Min (Will Render The Same As Calculated Treatment Time If Left Blank): 0.33
Initial Radiation Treatment Planning (Will Render If Bill Simulation = Yes): The patient had a complete consultation regarding all applicable modalities for the treatment of their skin cancer and based on a variety of factors including the type of lesion, size, and location, the relevant medical history as well as local tissue factors, the functional status of the individual, the ability to perform necessary postoperative wound instructions and the need for simultaneous treatments as well as overall wound healing status, it was determined that the patient would begin radiation therapy treatment for skin cancer.  A full simulation and treatment device design was performed including the determination and formulation of appropriate simple and complex devices including lead shield of 0.762 mm thickness to form molded customized shielding to specifically correlate with the lesion size including treatment margin.  The custom lead shield is adequate to accommodate the appropriate applicator and provide adequate shielding around the treatment site.  The specific field applicator, shields, and devices both simple and complex as well as the specific patient setup is outlined below.  The patient was given a full consent for superficial radiation to both verbally and in writing and the full determination of patient's eligibility for treatment and selection is outlined on the patient eligibility and treatment selection form.  The specific superficial radiotherapy prescription was determined and was documented on the superficial radiotherapy prescription form.  A treatment calculation was also performed and documented on the treatment calculation form.  Based on the prescription, the patient was scheduled for a series of fractional treatments.
Depth (Optional-Please Include Units): 1.98
Additional Prescription Justification Text: If there is any interruption in treatment exceeding 5 days please see Decay and Dose Adjustment Calculation and complete treatment under Prescription 2, 3 or 4 as appropriate.
Time Dose Fractionation (Optional- Include Units If Applicable): 93
Intro Statement (Will Not Render If Left Blank): The patient is undergoing superficial radiation therapy for skin cancer and presents for weekly evaluation and management.  Per protocol and as documented on the flow sheet, the patient was questioned as to subjective redness, pruritus, pain, drainage, fatigue, or any other symptoms.  Objectively, the radiation area was evaluated with regards to erythema, atrophy, scale, crusting, erosion, ulceration, edema, purpura, tenderness, warmth, drainage, and any other findings.  The plan was extensively reviewed including the dose, and dosing schedule.  The simulation and clinical setup was also reviewed as was the external and any internal shields and based on this review the appropriateness and sufficiency of treatment was determined.
Total Number Of Fractions: 25

## 2021-03-02 ENCOUNTER — APPOINTMENT (OUTPATIENT)
Dept: URBAN - METROPOLITAN AREA CLINIC 273 | Age: 76
Setting detail: DERMATOLOGY
End: 2021-03-02

## 2021-03-02 PROBLEM — C44.729 SQUAMOUS CELL CARCINOMA OF SKIN OF LEFT LOWER LIMB, INCLUDING HIP: Status: ACTIVE | Noted: 2021-03-02

## 2021-03-02 PROCEDURE — OTHER TREATMENT REGIMEN: OTHER

## 2021-03-02 PROCEDURE — 77280 THER RAD SIMULAJ FIELD SMPL: CPT

## 2021-03-02 PROCEDURE — G6001 ECHO GUIDANCE RADIOTHERAPY: HCPCS

## 2021-03-02 PROCEDURE — 77401 RADIATION TX DELIVERY SUPFC: CPT

## 2021-03-02 PROCEDURE — OTHER SUPERFICIAL RADIATION TREATMENT: OTHER

## 2021-03-02 NOTE — PROCEDURE: SUPERFICIAL RADIATION TREATMENT
Time Dose Fractionation (Optional- Include Units If Applicable): 93
Fractions / Week Rx 4: 5
Treatment Margins In Cm: 0.8
Custom Shielding Preamble Text Will Not Be Included With Simple Simulations (.......... X X Y Cm): A lead shield of 0.762 mm thickness is utilized to form a molded, custom shield with a
Comments: RTOG 1
Include Rx 2 When Rendering Additional Prescriptions: No
Dose Per Fractionation In Cgy (Optional): 230.01cGy
Dimensions-X Axis In Cm: 1.5
Field Size (Applicator): 4.0 cm
Total Number Of Fractions: 25
Treatment Time In Min (Optional): 0.33
Port Dimensions-X Axis In Cm: 3
Energy (Include Units): 70kV
Validate Note Data (See Information Below): Yes
Initial Radiation Treatment Planning (Will Render If Bill Simulation = Yes): The patient had a complete consultation regarding all applicable modalities for the treatment of their skin cancer and based on a variety of factors including the type of lesion, size, and location, the relevant medical history as well as local tissue factors, the functional status of the individual, the ability to perform necessary postoperative wound instructions and the need for simultaneous treatments as well as overall wound healing status, it was determined that the patient would begin radiation therapy treatment for skin cancer.  A full simulation and treatment device design was performed including the determination and formulation of appropriate simple and complex devices including lead shield of 0.762 mm thickness to form molded customized shielding to specifically correlate with the lesion size including treatment margin.  The custom lead shield is adequate to accommodate the appropriate applicator and provide adequate shielding around the treatment site.  The specific field applicator, shields, and devices both simple and complex as well as the specific patient setup is outlined below.  The patient was given a full consent for superficial radiation to both verbally and in writing and the full determination of patient's eligibility for treatment and selection is outlined on the patient eligibility and treatment selection form.  The specific superficial radiotherapy prescription was determined and was documented on the superficial radiotherapy prescription form.  A treatment calculation was also performed and documented on the treatment calculation form.  Based on the prescription, the patient was scheduled for a series of fractional treatments.
Treatment Time / Fractionation (Optional- Include Units): 0.33min
Fractionation Number: 18
Cumulative Dose In Cgy (Optional): 4140.18
Simple Simulation Preamble Text Will Be Included With Simple Simulations (.......... Indications): Simple simulation was performed today for the following reasons:
Number Of Treatment Days: 1
Patient Positioning: Supine
Limb Positioning Or Elevation: Legs Extended
Intro Statement (Will Not Render If Left Blank): The patient is undergoing superficial radiation therapy for skin cancer and presents for weekly evaluation and management.  Per protocol and as documented on the flow sheet, the patient was questioned as to subjective redness, pruritus, pain, drainage, fatigue, or any other symptoms.  Objectively, the radiation area was evaluated with regards to erythema, atrophy, scale, crusting, erosion, ulceration, edema, purpura, tenderness, warmth, drainage, and any other findings.  The plan was extensively reviewed including the dose, and dosing schedule.  The simulation and clinical setup was also reviewed as was the external and any internal shields and based on this review the appropriateness and sufficiency of treatment was determined.
Information: Selecting Yes will display possible errors in your note based on the variables you have selected. This validation is only offered as a suggestion for you. PLEASE NOTE THAT THE VALIDATION TEXT WILL BE REMOVED WHEN YOU FINALIZE YOUR NOTE. IF YOU WANT TO FAX A PRELIMINARY NOTE YOU WILL NEED TO TOGGLE THIS TO 'NO' IF YOU DO NOT WANT IT IN YOUR FAXED NOTE.
Assessment: Appropriate reaction
Shielding Size (Optional- Include Units): 3.0x3.0
Simple Simulation Afterword Text Will Be Included With Simple Simulations (Indications............): The patient had a complete consultation regarding all applicable modalities for the treatment of their lesion and based on a variety of factors including the type of lesion, size, and location, the relevant medical history as well as local tissue factors, the functional status of the individual, the ability to perform necessary postoperative wound instructions and the need for simultaneous treatments as well as overall wound healing status, it was determined that the patient would begin radiation therapy treatment for skin cancer.  A full simulation and treatment device design was performed including the determination and formulation of appropriate simple and complex devices including lead shield of 2.286 mm thickness to form molded customized shielding to specifically correlate with the lesion size including treatment margin.  The custom lead shield is adequate to accommodate the appropriate applicator and provide adequate shielding around the treatment site.  The specific field applicator, shields, and devices both simple and complex as well as the specific patient setup is outlined below.  The patient was given a full consent for superficial radiation to both verbally and in writing and the full determination of patient's eligibility for treatment and selection is outlined on the patient eligibility and treatment selection form.  The specific superficial radiotherapy prescription was determined and was documented on the superficial radiotherapy prescription form.  A treatment calculation was also performed and documented on the treatment calculation form.  Based on the prescription, the patient was scheduled for a series of fractional treatments.
Dose / Tx In Cgy (Optional): 230.01
Total Dose (Optional-Please Include Units): 5750.25cGy
Custom Shielding Afterword Text Will Not Be Included With Simple Simulations (X X Y Cm............): port to correlate with the lesion size, including treatment margin. The custom lead shield is adequate to accommodate the appropriate applicator and provide adequate shielding around the treatment site. Additional shielding (as noted below) is used to protect sensitive, normal tissues.
Day Of The Week Treatment Administered: Tuesday
Total Number Of Fractions Rx 4: 15
Pathology Override (Pathology Will Render As Diagnosis Name If Left Blank): Squamous Cell Carcinoma-Invasive
Treatment Device Design After Initial Simulation Justification (Will Render If Bill For Treatment Devices = Yes): The patient is status post radiation simulation and is evaluated as to the use of additional devices for shielding and placement for radiation therapy.
Depth (Optional-Please Include Units): 1.98
Functional Status: 0 (fully active)
Additional Prescription Justification Text: If there is any interruption in treatment exceeding 5 days please see Decay and Dose Adjustment Calculation and complete treatment under Prescription 2, 3 or 4 as appropriate.
Detail Level: Detailed

## 2021-03-02 NOTE — PROCEDURE: TREATMENT REGIMEN
Plan: This patient has been treated today with image guided superficial radiation therapy for non-melanoma skin cancer.  Written informed consent has been previously obtained from this patient for this treatment. This consent is documented in the patient’s chart. The patient gave verbal consent to continue treatment today.\\nThe patient was treated with a specific radiation dose and setup as prescribed by the provider listed on this visit note. A Radiation Therapist performed administration of radiation under supervision of provider. The treatment parameters and cumulative dose are indicated above.\\nPrior to administering the radiation, the patient underwent a verification therapeutic radiology simulation-aided field setting defining relevant normal and abnormal target anatomy and acquiring images with high frequency ultrasound in addition to data necessary developing optimal radiation treatment process for the patient. This process includes verification of the treatment port(s) and proper treatment positioning. All treatment ports were photographed within electronic medical record. The patient’s customized lead blocking along with gross tumor volume and margin was confirmed. Considering superficial radiotherapy is clinical in setup, this requires physician and radiation therapist to clarify location interest being treated against initial images, pathology and patient anatomy. Care was taken ensuring fields treated were geometrically accurate and properly positioned using therapeutic radiology simulation-aided field setting verification per fraction. This process is also utilized to determine if any prescription or setup changes are necessary. These steps are therefore medically necessary ensuring safe and effective administration of radiation. Ongoing therapeutic radiology simulation-aided field setting verification is ordered throughout course of therapy.\\nA high frequency ultrasound image was acquired today for two-dimensional evaluation of the tumor volume and response to treatment, in addition to geometric accuracy of field placement. US depth is 1.66mm, which is -0.22mm in difference from previous imaging. The field placement and ultrasound imaging, per fraction, is separate and distinct from the initial simulation, and is an important task in providing safe administration of superficial radiation therapy. Physician evaluation of the ultrasound tumor depth will be ongoing throughout the course of treatment and is deemed medically necessary in order to ensure the efficacy of treatment and any necessary changes. Today’s image was evaluated for determination of continuation of treatment with the current plan or with necessary changes as appropriate. According to provider review of verification therapeutic radiology simulation-aided field setting and imaging, no change is required. Additionally, the use of ultrasound visualization and targeted assessment allows the patient to be able to see their cancer(s) progress, encouraging patient to complete and maintain compliance through full course of radiotherapy.\\nPer Dr. Kristopher Finnegan, continued ultrasound guidance and therapeutic radiology simulation-aided field setting verification per fraction is required for field placement, measurement of tumor depth, progress and acute effect monitoring.
Detail Level: Zone

## 2021-03-04 ENCOUNTER — APPOINTMENT (OUTPATIENT)
Dept: URBAN - METROPOLITAN AREA CLINIC 273 | Age: 76
Setting detail: DERMATOLOGY
End: 2021-03-04

## 2021-03-04 PROBLEM — C44.729 SQUAMOUS CELL CARCINOMA OF SKIN OF LEFT LOWER LIMB, INCLUDING HIP: Status: ACTIVE | Noted: 2021-03-04

## 2021-03-04 PROCEDURE — OTHER TREATMENT REGIMEN: OTHER

## 2021-03-04 PROCEDURE — OTHER SUPERFICIAL RADIATION TREATMENT: OTHER

## 2021-03-04 PROCEDURE — G6001 ECHO GUIDANCE RADIOTHERAPY: HCPCS

## 2021-03-04 PROCEDURE — 77401 RADIATION TX DELIVERY SUPFC: CPT

## 2021-03-04 PROCEDURE — 77280 THER RAD SIMULAJ FIELD SMPL: CPT

## 2021-03-04 NOTE — PROCEDURE: SUPERFICIAL RADIATION TREATMENT
Field Size (Applicator): 4.0 cm
Bill For Radiation Treatment: Yes
Shielding Size (Optional- Include Units): 3.0x3.0
Treatment Device Design After Initial Simulation Justification (Will Render If Bill For Treatment Devices = Yes): The patient is status post radiation simulation and is evaluated as to the use of additional devices for shielding and placement for radiation therapy.
Depth (Optional-Please Include Units): 1.98
Total Number Of Fractions Rx 3: 15
Comments: RTOG 1
Day Of The Week Treatment Administered: Thursday
Total Number Of Fractions: 25
Dimensions-Y Axis In Cm: 1.5
Bill And Render Text From Evaluation And Management Tab (Will Bill [id number]): No
Total Dose (Optional-Please Include Units): 5750.25cGy
Cumulative Dose In Cgy (Optional): 4370.19
Total Number Of Fractions Rx 2: 3
Patient Positioning: Supine
Intro Statement (Will Not Render If Left Blank): The patient is undergoing superficial radiation therapy for skin cancer and presents for weekly evaluation and management.  Per protocol and as documented on the flow sheet, the patient was questioned as to subjective redness, pruritus, pain, drainage, fatigue, or any other symptoms.  Objectively, the radiation area was evaluated with regards to erythema, atrophy, scale, crusting, erosion, ulceration, edema, purpura, tenderness, warmth, drainage, and any other findings.  The plan was extensively reviewed including the dose, and dosing schedule.  The simulation and clinical setup was also reviewed as was the external and any internal shields and based on this review the appropriateness and sufficiency of treatment was determined.
Pathology Override (Pathology Will Render As Diagnosis Name If Left Blank): Squamous Cell Carcinoma-Invasive
Detail Level: Detailed
Custom Shielding Afterword Text Will Not Be Included With Simple Simulations (X X Y Cm............): port to correlate with the lesion size, including treatment margin. The custom lead shield is adequate to accommodate the appropriate applicator and provide adequate shielding around the treatment site. Additional shielding (as noted below) is used to protect sensitive, normal tissues.
Simple Simulation Preamble Text Will Be Included With Simple Simulations (.......... Indications): Simple simulation was performed today for the following reasons:
Treatment Time In Min (Optional): 0.33
Treatment Margins In Cm: 0.8
Limb Positioning Or Elevation: Legs Extended
Fractions / Week Rx 4: 5
Information: Selecting Yes will display possible errors in your note based on the variables you have selected. This validation is only offered as a suggestion for you. PLEASE NOTE THAT THE VALIDATION TEXT WILL BE REMOVED WHEN YOU FINALIZE YOUR NOTE. IF YOU WANT TO FAX A PRELIMINARY NOTE YOU WILL NEED TO TOGGLE THIS TO 'NO' IF YOU DO NOT WANT IT IN YOUR FAXED NOTE.
Additional Prescription Justification Text: If there is any interruption in treatment exceeding 5 days please see Decay and Dose Adjustment Calculation and complete treatment under Prescription 2, 3 or 4 as appropriate.
Assessment: Appropriate reaction
Number Of Treatment Days: 1
Simple Simulation Afterword Text Will Be Included With Simple Simulations (Indications............): The patient had a complete consultation regarding all applicable modalities for the treatment of their lesion and based on a variety of factors including the type of lesion, size, and location, the relevant medical history as well as local tissue factors, the functional status of the individual, the ability to perform necessary postoperative wound instructions and the need for simultaneous treatments as well as overall wound healing status, it was determined that the patient would begin radiation therapy treatment for skin cancer.  A full simulation and treatment device design was performed including the determination and formulation of appropriate simple and complex devices including lead shield of 2.286 mm thickness to form molded customized shielding to specifically correlate with the lesion size including treatment margin.  The custom lead shield is adequate to accommodate the appropriate applicator and provide adequate shielding around the treatment site.  The specific field applicator, shields, and devices both simple and complex as well as the specific patient setup is outlined below.  The patient was given a full consent for superficial radiation to both verbally and in writing and the full determination of patient's eligibility for treatment and selection is outlined on the patient eligibility and treatment selection form.  The specific superficial radiotherapy prescription was determined and was documented on the superficial radiotherapy prescription form.  A treatment calculation was also performed and documented on the treatment calculation form.  Based on the prescription, the patient was scheduled for a series of fractional treatments.
Daily Fractionated Dose (Optional- Include Units): 230.01cGy
Fractionation Number: 19
Energy (Include Units): 70kV
Time Dose Fractionation (Optional- Include Units If Applicable): 93
Initial Radiation Treatment Planning (Will Render If Bill Simulation = Yes): The patient had a complete consultation regarding all applicable modalities for the treatment of their skin cancer and based on a variety of factors including the type of lesion, size, and location, the relevant medical history as well as local tissue factors, the functional status of the individual, the ability to perform necessary postoperative wound instructions and the need for simultaneous treatments as well as overall wound healing status, it was determined that the patient would begin radiation therapy treatment for skin cancer.  A full simulation and treatment device design was performed including the determination and formulation of appropriate simple and complex devices including lead shield of 0.762 mm thickness to form molded customized shielding to specifically correlate with the lesion size including treatment margin.  The custom lead shield is adequate to accommodate the appropriate applicator and provide adequate shielding around the treatment site.  The specific field applicator, shields, and devices both simple and complex as well as the specific patient setup is outlined below.  The patient was given a full consent for superficial radiation to both verbally and in writing and the full determination of patient's eligibility for treatment and selection is outlined on the patient eligibility and treatment selection form.  The specific superficial radiotherapy prescription was determined and was documented on the superficial radiotherapy prescription form.  A treatment calculation was also performed and documented on the treatment calculation form.  Based on the prescription, the patient was scheduled for a series of fractional treatments.
Custom Shielding Preamble Text Will Not Be Included With Simple Simulations (.......... X X Y Cm): A lead shield of 0.762 mm thickness is utilized to form a molded, custom shield with a
Treatment Time / Fractionation (Optional- Include Units): 0.33min
Dose / Tx In Cgy (Optional): 230.01
Functional Status: 0 (fully active)

## 2021-03-04 NOTE — PROCEDURE: TREATMENT REGIMEN
Plan: This patient has been treated today with image guided superficial radiation therapy for non-melanoma skin cancer.  Written informed consent has been previously obtained from this patient for this treatment. This consent is documented in the patient’s chart. The patient gave verbal consent to continue treatment today.\\nThe patient was treated with a specific radiation dose and setup as prescribed by the provider listed on this visit note. A Radiation Therapist performed administration of radiation under supervision of provider. The treatment parameters and cumulative dose are indicated above.\\nPrior to administering the radiation, the patient underwent a verification therapeutic radiology simulation-aided field setting defining relevant normal and abnormal target anatomy and acquiring images with high frequency ultrasound in addition to data necessary developing optimal radiation treatment process for the patient. This process includes verification of the treatment port(s) and proper treatment positioning. All treatment ports were photographed within electronic medical record. The patient’s customized lead blocking along with gross tumor volume and margin was confirmed. Considering superficial radiotherapy is clinical in setup, this requires physician and radiation therapist to clarify location interest being treated against initial images, pathology and patient anatomy. Care was taken ensuring fields treated were geometrically accurate and properly positioned using therapeutic radiology simulation-aided field setting verification per fraction. This process is also utilized to determine if any prescription or setup changes are necessary. These steps are therefore medically necessary ensuring safe and effective administration of radiation. Ongoing therapeutic radiology simulation-aided field setting verification is ordered throughout course of therapy.\\nA high frequency ultrasound image was acquired today for two-dimensional evaluation of the tumor volume and response to treatment, in addition to geometric accuracy of field placement. US depth is 1.98mm, which is +0.32mm in difference from previous imaging. The field placement and ultrasound imaging, per fraction, is separate and distinct from the initial simulation, and is an important task in providing safe administration of superficial radiation therapy. Physician evaluation of the ultrasound tumor depth will be ongoing throughout the course of treatment and is deemed medically necessary in order to ensure the efficacy of treatment and any necessary changes. Today’s image was evaluated for determination of continuation of treatment with the current plan or with necessary changes as appropriate. According to provider review of verification therapeutic radiology simulation-aided field setting and imaging, no change is required. Additionally, the use of ultrasound visualization and targeted assessment allows the patient to be able to see their cancer(s) progress, encouraging patient to complete and maintain compliance through full course of radiotherapy.\\nPer Dr. Kristopher Finnegan, continued ultrasound guidance and therapeutic radiology simulation-aided field setting verification per fraction is required for field placement, measurement of tumor depth, progress and acute effect monitoring.
Detail Level: Zone

## 2021-03-07 ENCOUNTER — APPOINTMENT (OUTPATIENT)
Dept: URBAN - METROPOLITAN AREA CLINIC 273 | Age: 76
Setting detail: DERMATOLOGY
End: 2021-03-17

## 2021-03-07 PROCEDURE — 77336 RADIATION PHYSICS CONSULT: CPT

## 2021-03-08 ENCOUNTER — APPOINTMENT (OUTPATIENT)
Dept: URBAN - METROPOLITAN AREA CLINIC 273 | Age: 76
Setting detail: DERMATOLOGY
End: 2021-03-08

## 2021-03-08 PROBLEM — C44.729 SQUAMOUS CELL CARCINOMA OF SKIN OF LEFT LOWER LIMB, INCLUDING HIP: Status: ACTIVE | Noted: 2021-03-08

## 2021-03-08 PROCEDURE — OTHER SUPERFICIAL RADIATION TREATMENT: OTHER

## 2021-03-08 PROCEDURE — 77401 RADIATION TX DELIVERY SUPFC: CPT

## 2021-03-08 PROCEDURE — G6001 ECHO GUIDANCE RADIOTHERAPY: HCPCS

## 2021-03-08 PROCEDURE — OTHER TREATMENT REGIMEN: OTHER

## 2021-03-08 PROCEDURE — 77427 RADIATION TX MANAGEMENT X5: CPT

## 2021-03-08 PROCEDURE — 77280 THER RAD SIMULAJ FIELD SMPL: CPT

## 2021-03-08 NOTE — PROCEDURE: TREATMENT REGIMEN
Detail Level: Zone
Plan: This patient has been treated today with image guided superficial radiation therapy for non-melanoma skin cancer.  Written informed consent has been previously obtained from this patient for this treatment. This consent is documented in the patient’s chart. The patient gave verbal consent to continue treatment today.\\nThe patient was treated with a specific radiation dose and setup as prescribed by the provider listed on this visit note. A Radiation Therapist performed administration of radiation under supervision of provider. The treatment parameters and cumulative dose are indicated above.\\nPrior to administering the radiation, the patient underwent a verification therapeutic radiology simulation-aided field setting defining relevant normal and abnormal target anatomy and acquiring images with high frequency ultrasound in addition to data necessary developing optimal radiation treatment process for the patient. This process includes verification of the treatment port(s) and proper treatment positioning. All treatment ports were photographed within electronic medical record. The patient’s customized lead blocking along with gross tumor volume and margin was confirmed. Considering superficial radiotherapy is clinical in setup, this requires physician and radiation therapist to clarify location interest being treated against initial images, pathology and patient anatomy. Care was taken ensuring fields treated were geometrically accurate and properly positioned using therapeutic radiology simulation-aided field setting verification per fraction. This process is also utilized to determine if any prescription or setup changes are necessary. These steps are therefore medically necessary ensuring safe and effective administration of radiation. Ongoing therapeutic radiology simulation-aided field setting verification is ordered throughout course of therapy.\\nA high frequency ultrasound image was acquired today for two-dimensional evaluation of the tumor volume and response to treatment, in addition to geometric accuracy of field placement. US depth is 1.82mm, which is -0.16mm in difference from previous imaging. The field placement and ultrasound imaging, per fraction, is separate and distinct from the initial simulation, and is an important task in providing safe administration of superficial radiation therapy. Physician evaluation of the ultrasound tumor depth will be ongoing throughout the course of treatment and is deemed medically necessary in order to ensure the efficacy of treatment and any necessary changes. Today’s image was evaluated for determination of continuation of treatment with the current plan or with necessary changes as appropriate. According to provider review of verification therapeutic radiology simulation-aided field setting and imaging, no change is required. Additionally, the use of ultrasound visualization and targeted assessment allows the patient to be able to see their cancer(s) progress, encouraging patient to complete and maintain compliance through full course of radiotherapy.\\nPer Dr. Kristopher Finnegan, continued ultrasound guidance and therapeutic radiology simulation-aided field setting verification per fraction is required for field placement, measurement of tumor depth, progress and acute effect monitoring.

## 2021-03-08 NOTE — PROCEDURE: SUPERFICIAL RADIATION TREATMENT
Limb Positioning Or Elevation: Legs Extended
Fractionation Number: 20
Additional Prescription Justification Text: If there is any interruption in treatment exceeding 5 days please see Decay and Dose Adjustment Calculation and complete treatment under Prescription 2, 3 or 4 as appropriate.
Fractions / Week: 3
Simple Simulation Preamble Text Will Be Included With Simple Simulations (.......... Indications): Simple simulation was performed today for the following reasons:
Field Size (Applicator): 4.0 cm
Total Dose (Optional-Please Include Units): 5750.25cGy
Bill For Dosimetry/Render Decay And Dose Adjustment Calculation In Note: No
Treatment Margins In Cm: 0.8
Dimensions-X Axis In Cm: 1.5
Cumulative Dose In Cgy (Optional): 4600.20
Day Of The Week Treatment Administered: Monday
Number Of Treatment Days: 1
Shielding Size (Optional- Include Units): 3.0x3.0
Pathology Override (Pathology Will Render As Diagnosis Name If Left Blank): Squamous Cell Carcinoma-Invasive
Simple Simulation Afterword Text Will Be Included With Simple Simulations (Indications............): The patient had a complete consultation regarding all applicable modalities for the treatment of their lesion and based on a variety of factors including the type of lesion, size, and location, the relevant medical history as well as local tissue factors, the functional status of the individual, the ability to perform necessary postoperative wound instructions and the need for simultaneous treatments as well as overall wound healing status, it was determined that the patient would begin radiation therapy treatment for skin cancer.  A full simulation and treatment device design was performed including the determination and formulation of appropriate simple and complex devices including lead shield of 2.286 mm thickness to form molded customized shielding to specifically correlate with the lesion size including treatment margin.  The custom lead shield is adequate to accommodate the appropriate applicator and provide adequate shielding around the treatment site.  The specific field applicator, shields, and devices both simple and complex as well as the specific patient setup is outlined below.  The patient was given a full consent for superficial radiation to both verbally and in writing and the full determination of patient's eligibility for treatment and selection is outlined on the patient eligibility and treatment selection form.  The specific superficial radiotherapy prescription was determined and was documented on the superficial radiotherapy prescription form.  A treatment calculation was also performed and documented on the treatment calculation form.  Based on the prescription, the patient was scheduled for a series of fractional treatments.
Functional Status: 0 (fully active)
Intro Statement (Will Not Render If Left Blank): The patient is undergoing superficial radiation therapy for skin cancer and presents for weekly evaluation and management.  Per protocol and as documented on the flow sheet, the patient was questioned as to subjective redness, pruritus, pain, drainage, fatigue, or any other symptoms.  Objectively, the radiation area was evaluated with regards to erythema, atrophy, scale, crusting, erosion, ulceration, edema, purpura, tenderness, warmth, drainage, and any other findings.  The plan was extensively reviewed including the dose, and dosing schedule.  The simulation and clinical setup was also reviewed as was the external and any internal shields and based on this review the appropriateness and sufficiency of treatment was determined.
Include Rx 3 When Rendering Additional Prescriptions: Yes
Energy (Optional-Please Include Units): 70kV
Treatment Time In Min (Optional): 0.33
Treatment Time / Fractionation (Optional- Include Units): 0.33min
Daily Fractionated Dose (Optional- Include Units): 230.01cGy
Assessment: Appropriate reaction
Detail Level: Detailed
Depth (Optional-Please Include Units): 1.98
Total Number Of Fractions Rx 4: 15
Initial Radiation Treatment Planning (Will Render If Bill Simulation = Yes): The patient had a complete consultation regarding all applicable modalities for the treatment of their skin cancer and based on a variety of factors including the type of lesion, size, and location, the relevant medical history as well as local tissue factors, the functional status of the individual, the ability to perform necessary postoperative wound instructions and the need for simultaneous treatments as well as overall wound healing status, it was determined that the patient would begin radiation therapy treatment for skin cancer.  A full simulation and treatment device design was performed including the determination and formulation of appropriate simple and complex devices including lead shield of 0.762 mm thickness to form molded customized shielding to specifically correlate with the lesion size including treatment margin.  The custom lead shield is adequate to accommodate the appropriate applicator and provide adequate shielding around the treatment site.  The specific field applicator, shields, and devices both simple and complex as well as the specific patient setup is outlined below.  The patient was given a full consent for superficial radiation to both verbally and in writing and the full determination of patient's eligibility for treatment and selection is outlined on the patient eligibility and treatment selection form.  The specific superficial radiotherapy prescription was determined and was documented on the superficial radiotherapy prescription form.  A treatment calculation was also performed and documented on the treatment calculation form.  Based on the prescription, the patient was scheduled for a series of fractional treatments.
Dose / Tx In Cgy (Optional): 230.01
Custom Shielding Preamble Text Will Not Be Included With Simple Simulations (.......... X X Y Cm): A lead shield of 0.762 mm thickness is utilized to form a molded, custom shield with a
Time Dose Fractionation (Optional- Include Units If Applicable): 93
Information: Selecting Yes will display possible errors in your note based on the variables you have selected. This validation is only offered as a suggestion for you. PLEASE NOTE THAT THE VALIDATION TEXT WILL BE REMOVED WHEN YOU FINALIZE YOUR NOTE. IF YOU WANT TO FAX A PRELIMINARY NOTE YOU WILL NEED TO TOGGLE THIS TO 'NO' IF YOU DO NOT WANT IT IN YOUR FAXED NOTE.
Fractions / Week Rx 4: 5
Custom Shielding Afterword Text Will Not Be Included With Simple Simulations (X X Y Cm............): port to correlate with the lesion size, including treatment margin. The custom lead shield is adequate to accommodate the appropriate applicator and provide adequate shielding around the treatment site. Additional shielding (as noted below) is used to protect sensitive, normal tissues.
Comments: RTOG 1
Patient Positioning: Supine
Treatment Device Design After Initial Simulation Justification (Will Render If Bill For Treatment Devices = Yes): The patient is status post radiation simulation and is evaluated as to the use of additional devices for shielding and placement for radiation therapy.
Total Number Of Fractions: 25

## 2021-03-09 ENCOUNTER — APPOINTMENT (OUTPATIENT)
Dept: URBAN - METROPOLITAN AREA CLINIC 273 | Age: 76
Setting detail: DERMATOLOGY
End: 2021-03-09

## 2021-03-09 PROBLEM — C44.729 SQUAMOUS CELL CARCINOMA OF SKIN OF LEFT LOWER LIMB, INCLUDING HIP: Status: ACTIVE | Noted: 2021-03-09

## 2021-03-09 PROCEDURE — OTHER TREATMENT REGIMEN: OTHER

## 2021-03-09 PROCEDURE — 77280 THER RAD SIMULAJ FIELD SMPL: CPT

## 2021-03-09 PROCEDURE — OTHER SUPERFICIAL RADIATION TREATMENT: OTHER

## 2021-03-09 PROCEDURE — G6001 ECHO GUIDANCE RADIOTHERAPY: HCPCS

## 2021-03-09 PROCEDURE — 77401 RADIATION TX DELIVERY SUPFC: CPT

## 2021-03-09 NOTE — PROCEDURE: SUPERFICIAL RADIATION TREATMENT
Field Size (Applicator): 4.0 cm
Limb Positioning Or Elevation: Legs Extended
Energy (Optional-Please Include Units): 70kV
Assessment: Appropriate reaction
Validate Note Data (See Information Below): Yes
Field Number: 1
Detail Level: Detailed
Computed Treatment Time In Min (Will Render The Same As Calculated Treatment Time If Left Blank): 0.33
Fractions / Week: 3
Dose / Tx In Cgy (Optional): 230.01
Depth (Optional-Please Include Units): 1.98
Treatment Device Design After Initial Simulation Justification (Will Render If Bill For Treatment Devices = Yes): The patient is status post radiation simulation and is evaluated as to the use of additional devices for shielding and placement for radiation therapy.
Bill For Treatment Devices Only: No
Day Of The Week Treatment Administered: Tuesday
Custom Shielding Preamble Text Will Not Be Included With Simple Simulations (.......... X X Y Cm): A lead shield of 0.762 mm thickness is utilized to form a molded, custom shield with a
Information: Selecting Yes will display possible errors in your note based on the variables you have selected. This validation is only offered as a suggestion for you. PLEASE NOTE THAT THE VALIDATION TEXT WILL BE REMOVED WHEN YOU FINALIZE YOUR NOTE. IF YOU WANT TO FAX A PRELIMINARY NOTE YOU WILL NEED TO TOGGLE THIS TO 'NO' IF YOU DO NOT WANT IT IN YOUR FAXED NOTE.
Shielding Size (Optional- Include Units): 3.0x3.0
Custom Shielding Afterword Text Will Not Be Included With Simple Simulations (X X Y Cm............): port to correlate with the lesion size, including treatment margin. The custom lead shield is adequate to accommodate the appropriate applicator and provide adequate shielding around the treatment site. Additional shielding (as noted below) is used to protect sensitive, normal tissues.
Total Number Of Fractions Rx 4: 15
Treatment Time / Fractionation (Optional- Include Units): 0.33min
Dimensions-Y Axis In Cm: 1.5
Fractionation Number: 21
Additional Prescription Justification Text: If there is any interruption in treatment exceeding 5 days please see Decay and Dose Adjustment Calculation and complete treatment under Prescription 2, 3 or 4 as appropriate.
Patient Positioning: Supine
Time Dose Fractionation (Optional- Include Units If Applicable): 93
Treatment Margins In Cm: 0.8
Fractions / Week Rx 4: 5
Total Dose (Optional-Please Include Units): 5750.25cGy
Functional Status: 0 (fully active)
Cumulative Dose In Cgy (Optional): 4830.21
Daily Fractionated Dose (Optional- Include Units): 230.01cGy
Intro Statement (Will Not Render If Left Blank): The patient is undergoing superficial radiation therapy for skin cancer and presents for weekly evaluation and management.  Per protocol and as documented on the flow sheet, the patient was questioned as to subjective redness, pruritus, pain, drainage, fatigue, or any other symptoms.  Objectively, the radiation area was evaluated with regards to erythema, atrophy, scale, crusting, erosion, ulceration, edema, purpura, tenderness, warmth, drainage, and any other findings.  The plan was extensively reviewed including the dose, and dosing schedule.  The simulation and clinical setup was also reviewed as was the external and any internal shields and based on this review the appropriateness and sufficiency of treatment was determined.
Total Number Of Fractions: 25
Fractionation Number (Evaluation): 20
Simple Simulation Afterword Text Will Be Included With Simple Simulations (Indications............): The patient had a complete consultation regarding all applicable modalities for the treatment of their lesion and based on a variety of factors including the type of lesion, size, and location, the relevant medical history as well as local tissue factors, the functional status of the individual, the ability to perform necessary postoperative wound instructions and the need for simultaneous treatments as well as overall wound healing status, it was determined that the patient would begin radiation therapy treatment for skin cancer.  A full simulation and treatment device design was performed including the determination and formulation of appropriate simple and complex devices including lead shield of 2.286 mm thickness to form molded customized shielding to specifically correlate with the lesion size including treatment margin.  The custom lead shield is adequate to accommodate the appropriate applicator and provide adequate shielding around the treatment site.  The specific field applicator, shields, and devices both simple and complex as well as the specific patient setup is outlined below.  The patient was given a full consent for superficial radiation to both verbally and in writing and the full determination of patient's eligibility for treatment and selection is outlined on the patient eligibility and treatment selection form.  The specific superficial radiotherapy prescription was determined and was documented on the superficial radiotherapy prescription form.  A treatment calculation was also performed and documented on the treatment calculation form.  Based on the prescription, the patient was scheduled for a series of fractional treatments.
Initial Radiation Treatment Planning (Will Render If Bill Simulation = Yes): The patient had a complete consultation regarding all applicable modalities for the treatment of their skin cancer and based on a variety of factors including the type of lesion, size, and location, the relevant medical history as well as local tissue factors, the functional status of the individual, the ability to perform necessary postoperative wound instructions and the need for simultaneous treatments as well as overall wound healing status, it was determined that the patient would begin radiation therapy treatment for skin cancer.  A full simulation and treatment device design was performed including the determination and formulation of appropriate simple and complex devices including lead shield of 0.762 mm thickness to form molded customized shielding to specifically correlate with the lesion size including treatment margin.  The custom lead shield is adequate to accommodate the appropriate applicator and provide adequate shielding around the treatment site.  The specific field applicator, shields, and devices both simple and complex as well as the specific patient setup is outlined below.  The patient was given a full consent for superficial radiation to both verbally and in writing and the full determination of patient's eligibility for treatment and selection is outlined on the patient eligibility and treatment selection form.  The specific superficial radiotherapy prescription was determined and was documented on the superficial radiotherapy prescription form.  A treatment calculation was also performed and documented on the treatment calculation form.  Based on the prescription, the patient was scheduled for a series of fractional treatments.
Comments: RTOG 1
Pathology Override (Pathology Will Render As Diagnosis Name If Left Blank): Squamous Cell Carcinoma-Invasive
Simple Simulation Preamble Text Will Be Included With Simple Simulations (.......... Indications): Simple simulation was performed today for the following reasons:

## 2021-03-09 NOTE — PROCEDURE: TREATMENT REGIMEN
Plan: This patient has been treated today with image guided superficial radiation therapy for non-melanoma skin cancer.  Written informed consent has been previously obtained from this patient for this treatment. This consent is documented in the patient’s chart. The patient gave verbal consent to continue treatment today.\\nThe patient was treated with a specific radiation dose and setup as prescribed by the provider listed on this visit note. A Radiation Therapist performed administration of radiation under supervision of provider. The treatment parameters and cumulative dose are indicated above.\\nPrior to administering the radiation, the patient underwent a verification therapeutic radiology simulation-aided field setting defining relevant normal and abnormal target anatomy and acquiring images with high frequency ultrasound in addition to data necessary developing optimal radiation treatment process for the patient. This process includes verification of the treatment port(s) and proper treatment positioning. All treatment ports were photographed within electronic medical record. The patient’s customized lead blocking along with gross tumor volume and margin was confirmed. Considering superficial radiotherapy is clinical in setup, this requires physician and radiation therapist to clarify location interest being treated against initial images, pathology and patient anatomy. Care was taken ensuring fields treated were geometrically accurate and properly positioned using therapeutic radiology simulation-aided field setting verification per fraction. This process is also utilized to determine if any prescription or setup changes are necessary. These steps are therefore medically necessary ensuring safe and effective administration of radiation. Ongoing therapeutic radiology simulation-aided field setting verification is ordered throughout course of therapy.\\nA high frequency ultrasound image was acquired today for two-dimensional evaluation of the tumor volume and response to treatment, in addition to geometric accuracy of field placement. US depth is 1.87mm, which is +0.05mm in difference from previous imaging. The field placement and ultrasound imaging, per fraction, is separate and distinct from the initial simulation, and is an important task in providing safe administration of superficial radiation therapy. Physician evaluation of the ultrasound tumor depth will be ongoing throughout the course of treatment and is deemed medically necessary in order to ensure the efficacy of treatment and any necessary changes. Today’s image was evaluated for determination of continuation of treatment with the current plan or with necessary changes as appropriate. According to provider review of verification therapeutic radiology simulation-aided field setting and imaging, no change is required. Additionally, the use of ultrasound visualization and targeted assessment allows the patient to be able to see their cancer(s) progress, encouraging patient to complete and maintain compliance through full course of radiotherapy.\\nPer Dr. Kristopher Finnegan, continued ultrasound guidance and therapeutic radiology simulation-aided field setting verification per fraction is required for field placement, measurement of tumor depth, progress and acute effect monitoring.
Detail Level: Zone

## 2021-03-11 ENCOUNTER — APPOINTMENT (OUTPATIENT)
Dept: URBAN - METROPOLITAN AREA CLINIC 273 | Age: 76
Setting detail: DERMATOLOGY
End: 2021-03-11

## 2021-03-11 PROBLEM — C44.729 SQUAMOUS CELL CARCINOMA OF SKIN OF LEFT LOWER LIMB, INCLUDING HIP: Status: ACTIVE | Noted: 2021-03-11

## 2021-03-11 PROCEDURE — OTHER SUPERFICIAL RADIATION TREATMENT: OTHER

## 2021-03-11 PROCEDURE — 77401 RADIATION TX DELIVERY SUPFC: CPT

## 2021-03-11 PROCEDURE — OTHER TREATMENT REGIMEN: OTHER

## 2021-03-11 PROCEDURE — G6001 ECHO GUIDANCE RADIOTHERAPY: HCPCS

## 2021-03-11 PROCEDURE — 77280 THER RAD SIMULAJ FIELD SMPL: CPT

## 2021-03-11 NOTE — PROCEDURE: SUPERFICIAL RADIATION TREATMENT
Total Number Of Fractions Rx 4: 15
Custom Shielding Preamble Text Will Not Be Included With Simple Simulations (.......... X X Y Cm): A lead shield of 0.762 mm thickness is utilized to form a molded, custom shield with a
Bill For Treatment Devices Only: No
Assessment: Appropriate reaction
Treatment Time In Min (Optional): 0.33
Validate Note Data (See Information Below): Yes
Field Size (Applicator): 4.0 cm
Energy (Optional-Please Include Units): 70kV
Port Dimensions-X Axis In Cm: 3
Treatment Margins In Cm: 0.8
Fractionation Number (Evaluation): 20
Simple Simulation Afterword Text Will Be Included With Simple Simulations (Indications............): The patient had a complete consultation regarding all applicable modalities for the treatment of their lesion and based on a variety of factors including the type of lesion, size, and location, the relevant medical history as well as local tissue factors, the functional status of the individual, the ability to perform necessary postoperative wound instructions and the need for simultaneous treatments as well as overall wound healing status, it was determined that the patient would begin radiation therapy treatment for skin cancer.  A full simulation and treatment device design was performed including the determination and formulation of appropriate simple and complex devices including lead shield of 2.286 mm thickness to form molded customized shielding to specifically correlate with the lesion size including treatment margin.  The custom lead shield is adequate to accommodate the appropriate applicator and provide adequate shielding around the treatment site.  The specific field applicator, shields, and devices both simple and complex as well as the specific patient setup is outlined below.  The patient was given a full consent for superficial radiation to both verbally and in writing and the full determination of patient's eligibility for treatment and selection is outlined on the patient eligibility and treatment selection form.  The specific superficial radiotherapy prescription was determined and was documented on the superficial radiotherapy prescription form.  A treatment calculation was also performed and documented on the treatment calculation form.  Based on the prescription, the patient was scheduled for a series of fractional treatments.
Prescription Used: 1
Functional Status: 0 (fully active)
Total Number Of Fractions: 25
Limb Positioning Or Elevation: Legs Extended
Dose / Tx In Cgy (Optional): 230.01
Time Dose Fractionation (Optional- Include Units If Applicable): 93
Fractions / Week Rx 4: 5
Simple Simulation Preamble Text Will Be Included With Simple Simulations (.......... Indications): Simple simulation was performed today for the following reasons:
Detail Level: Detailed
Dose Per Fractionation In Cgy (Optional): 230.01cGy
Total Dose (Optional-Please Include Units): 5750.25cGy
Treatment Time / Fractionation (Optional- Include Units): 0.33min
Shielding Size (Optional- Include Units): 3.0x3.0
Depth (Optional-Please Include Units): 1.98
Fractionation Number: 22
Comments: RTOG 1
Initial Radiation Treatment Planning (Will Render If Bill Simulation = Yes): The patient had a complete consultation regarding all applicable modalities for the treatment of their skin cancer and based on a variety of factors including the type of lesion, size, and location, the relevant medical history as well as local tissue factors, the functional status of the individual, the ability to perform necessary postoperative wound instructions and the need for simultaneous treatments as well as overall wound healing status, it was determined that the patient would begin radiation therapy treatment for skin cancer.  A full simulation and treatment device design was performed including the determination and formulation of appropriate simple and complex devices including lead shield of 0.762 mm thickness to form molded customized shielding to specifically correlate with the lesion size including treatment margin.  The custom lead shield is adequate to accommodate the appropriate applicator and provide adequate shielding around the treatment site.  The specific field applicator, shields, and devices both simple and complex as well as the specific patient setup is outlined below.  The patient was given a full consent for superficial radiation to both verbally and in writing and the full determination of patient's eligibility for treatment and selection is outlined on the patient eligibility and treatment selection form.  The specific superficial radiotherapy prescription was determined and was documented on the superficial radiotherapy prescription form.  A treatment calculation was also performed and documented on the treatment calculation form.  Based on the prescription, the patient was scheduled for a series of fractional treatments.
Patient Positioning: Supine
Dimensions-Y Axis In Cm: 1.5
Intro Statement (Will Not Render If Left Blank): The patient is undergoing superficial radiation therapy for skin cancer and presents for weekly evaluation and management.  Per protocol and as documented on the flow sheet, the patient was questioned as to subjective redness, pruritus, pain, drainage, fatigue, or any other symptoms.  Objectively, the radiation area was evaluated with regards to erythema, atrophy, scale, crusting, erosion, ulceration, edema, purpura, tenderness, warmth, drainage, and any other findings.  The plan was extensively reviewed including the dose, and dosing schedule.  The simulation and clinical setup was also reviewed as was the external and any internal shields and based on this review the appropriateness and sufficiency of treatment was determined.
Information: Selecting Yes will display possible errors in your note based on the variables you have selected. This validation is only offered as a suggestion for you. PLEASE NOTE THAT THE VALIDATION TEXT WILL BE REMOVED WHEN YOU FINALIZE YOUR NOTE. IF YOU WANT TO FAX A PRELIMINARY NOTE YOU WILL NEED TO TOGGLE THIS TO 'NO' IF YOU DO NOT WANT IT IN YOUR FAXED NOTE.
Pathology Override (Pathology Will Render As Diagnosis Name If Left Blank): Squamous Cell Carcinoma-Invasive
Treatment Device Design After Initial Simulation Justification (Will Render If Bill For Treatment Devices = Yes): The patient is status post radiation simulation and is evaluated as to the use of additional devices for shielding and placement for radiation therapy.
Custom Shielding Afterword Text Will Not Be Included With Simple Simulations (X X Y Cm............): port to correlate with the lesion size, including treatment margin. The custom lead shield is adequate to accommodate the appropriate applicator and provide adequate shielding around the treatment site. Additional shielding (as noted below) is used to protect sensitive, normal tissues.
Cumulative Dose In Cgy (Optional): 5060.22
Additional Prescription Justification Text: If there is any interruption in treatment exceeding 5 days please see Decay and Dose Adjustment Calculation and complete treatment under Prescription 2, 3 or 4 as appropriate.
Day Of The Week Treatment Administered: Thursday

## 2021-03-11 NOTE — PROCEDURE: TREATMENT REGIMEN
Detail Level: Zone
Plan: This patient has been treated today with image guided superficial radiation therapy for non-melanoma skin cancer.  Written informed consent has been previously obtained from this patient for this treatment. This consent is documented in the patient’s chart. The patient gave verbal consent to continue treatment today.\\nThe patient was treated with a specific radiation dose and setup as prescribed by the provider listed on this visit note. A Radiation Therapist performed administration of radiation under supervision of provider. The treatment parameters and cumulative dose are indicated above.\\nPrior to administering the radiation, the patient underwent a verification therapeutic radiology simulation-aided field setting defining relevant normal and abnormal target anatomy and acquiring images with high frequency ultrasound in addition to data necessary developing optimal radiation treatment process for the patient. This process includes verification of the treatment port(s) and proper treatment positioning. All treatment ports were photographed within electronic medical record. The patient’s customized lead blocking along with gross tumor volume and margin was confirmed. Considering superficial radiotherapy is clinical in setup, this requires physician and radiation therapist to clarify location interest being treated against initial images, pathology and patient anatomy. Care was taken ensuring fields treated were geometrically accurate and properly positioned using therapeutic radiology simulation-aided field setting verification per fraction. This process is also utilized to determine if any prescription or setup changes are necessary. These steps are therefore medically necessary ensuring safe and effective administration of radiation. Ongoing therapeutic radiology simulation-aided field setting verification is ordered throughout course of therapy.\\nA high frequency ultrasound image was acquired today for two-dimensional evaluation of the tumor volume and response to treatment, in addition to geometric accuracy of field placement. US depth is 1.97mm, which is +0.10mm in difference from previous imaging. The field placement and ultrasound imaging, per fraction, is separate and distinct from the initial simulation, and is an important task in providing safe administration of superficial radiation therapy. Physician evaluation of the ultrasound tumor depth will be ongoing throughout the course of treatment and is deemed medically necessary in order to ensure the efficacy of treatment and any necessary changes. Today’s image was evaluated for determination of continuation of treatment with the current plan or with necessary changes as appropriate. According to provider review of verification therapeutic radiology simulation-aided field setting and imaging, no change is required. Additionally, the use of ultrasound visualization and targeted assessment allows the patient to be able to see their cancer(s) progress, encouraging patient to complete and maintain compliance through full course of radiotherapy.\\nPer Dr. Kristopher Finnegan, continued ultrasound guidance and therapeutic radiology simulation-aided field setting verification per fraction is required for field placement, measurement of tumor depth, progress and acute effect monitoring.

## 2021-03-15 ENCOUNTER — APPOINTMENT (OUTPATIENT)
Dept: URBAN - METROPOLITAN AREA CLINIC 273 | Age: 76
Setting detail: DERMATOLOGY
End: 2021-03-15

## 2021-03-15 PROBLEM — C44.729 SQUAMOUS CELL CARCINOMA OF SKIN OF LEFT LOWER LIMB, INCLUDING HIP: Status: ACTIVE | Noted: 2021-03-15

## 2021-03-15 PROCEDURE — OTHER TREATMENT REGIMEN: OTHER

## 2021-03-15 PROCEDURE — G6001 ECHO GUIDANCE RADIOTHERAPY: HCPCS

## 2021-03-15 PROCEDURE — 77280 THER RAD SIMULAJ FIELD SMPL: CPT

## 2021-03-15 PROCEDURE — 77401 RADIATION TX DELIVERY SUPFC: CPT

## 2021-03-15 PROCEDURE — OTHER SUPERFICIAL RADIATION TREATMENT: OTHER

## 2021-03-15 NOTE — PROCEDURE: TREATMENT REGIMEN
Detail Level: Zone
Plan: This patient has been treated today with image guided superficial radiation therapy for non-melanoma skin cancer.  Written informed consent has been previously obtained from this patient for this treatment. This consent is documented in the patient’s chart. The patient gave verbal consent to continue treatment today.\\nThe patient was treated with a specific radiation dose and setup as prescribed by the provider listed on this visit note. A Radiation Therapist performed administration of radiation under supervision of provider. The treatment parameters and cumulative dose are indicated above.\\nPrior to administering the radiation, the patient underwent a verification therapeutic radiology simulation-aided field setting defining relevant normal and abnormal target anatomy and acquiring images with high frequency ultrasound in addition to data necessary developing optimal radiation treatment process for the patient. This process includes verification of the treatment port(s) and proper treatment positioning. All treatment ports were photographed within electronic medical record. The patient’s customized lead blocking along with gross tumor volume and margin was confirmed. Considering superficial radiotherapy is clinical in setup, this requires physician and radiation therapist to clarify location interest being treated against initial images, pathology and patient anatomy. Care was taken ensuring fields treated were geometrically accurate and properly positioned using therapeutic radiology simulation-aided field setting verification per fraction. This process is also utilized to determine if any prescription or setup changes are necessary. These steps are therefore medically necessary ensuring safe and effective administration of radiation. Ongoing therapeutic radiology simulation-aided field setting verification is ordered throughout course of therapy.\\nA high frequency ultrasound image was acquired today for two-dimensional evaluation of the tumor volume and response to treatment, in addition to geometric accuracy of field placement. US depth is 1.51mm, which is -0.46mm in difference from previous imaging. The field placement and ultrasound imaging, per fraction, is separate and distinct from the initial simulation, and is an important task in providing safe administration of superficial radiation therapy. Physician evaluation of the ultrasound tumor depth will be ongoing throughout the course of treatment and is deemed medically necessary in order to ensure the efficacy of treatment and any necessary changes. Today’s image was evaluated for determination of continuation of treatment with the current plan or with necessary changes as appropriate. According to provider review of verification therapeutic radiology simulation-aided field setting and imaging, no change is required. Additionally, the use of ultrasound visualization and targeted assessment allows the patient to be able to see their cancer(s) progress, encouraging patient to complete and maintain compliance through full course of radiotherapy.\\nPer Dr. Kristopher Finnegan, continued ultrasound guidance and therapeutic radiology simulation-aided field setting verification per fraction is required for field placement, measurement of tumor depth, progress and acute effect monitoring.

## 2021-03-15 NOTE — PROCEDURE: SUPERFICIAL RADIATION TREATMENT
Bill For Dosimetry/Render Decay And Dose Adjustment Calculation In Note: No
Patient Positioning: Supine
Comments: RTOG 1
Fractions / Week Rx 4: 5
Additional Prescription Justification Text: If there is any interruption in treatment exceeding 5 days please see Decay and Dose Adjustment Calculation and complete treatment under Prescription 2, 3 or 4 as appropriate.
Prescription Used: 1
Custom Shielding Afterword Text Will Not Be Included With Simple Simulations (X X Y Cm............): port to correlate with the lesion size, including treatment margin. The custom lead shield is adequate to accommodate the appropriate applicator and provide adequate shielding around the treatment site. Additional shielding (as noted below) is used to protect sensitive, normal tissues.
Field Size (Applicator): 4.0 cm
Energy (Optional-Please Include Units): 70kV
Total Number Of Fractions: 25
Simple Simulation Preamble Text Will Be Included With Simple Simulations (.......... Indications): Simple simulation was performed today for the following reasons:
Fractions / Week: 3
Treatment Time In Min (Optional): 0.33
Day Of The Week Treatment Administered: Monday
Include Rx 4 When Rendering Additional Prescriptions: Yes
Treatment Margins In Cm: 0.8
Simple Simulation Afterword Text Will Be Included With Simple Simulations (Indications............): The patient had a complete consultation regarding all applicable modalities for the treatment of their lesion and based on a variety of factors including the type of lesion, size, and location, the relevant medical history as well as local tissue factors, the functional status of the individual, the ability to perform necessary postoperative wound instructions and the need for simultaneous treatments as well as overall wound healing status, it was determined that the patient would begin radiation therapy treatment for skin cancer.  A full simulation and treatment device design was performed including the determination and formulation of appropriate simple and complex devices including lead shield of 2.286 mm thickness to form molded customized shielding to specifically correlate with the lesion size including treatment margin.  The custom lead shield is adequate to accommodate the appropriate applicator and provide adequate shielding around the treatment site.  The specific field applicator, shields, and devices both simple and complex as well as the specific patient setup is outlined below.  The patient was given a full consent for superficial radiation to both verbally and in writing and the full determination of patient's eligibility for treatment and selection is outlined on the patient eligibility and treatment selection form.  The specific superficial radiotherapy prescription was determined and was documented on the superficial radiotherapy prescription form.  A treatment calculation was also performed and documented on the treatment calculation form.  Based on the prescription, the patient was scheduled for a series of fractional treatments.
Fractionation Number (Evaluation): 20
Daily Fractionated Dose (Optional- Include Units): 230.01cGy
Detail Level: Detailed
Dose / Tx In Cgy (Optional): 230.01
Information: Selecting Yes will display possible errors in your note based on the variables you have selected. This validation is only offered as a suggestion for you. PLEASE NOTE THAT THE VALIDATION TEXT WILL BE REMOVED WHEN YOU FINALIZE YOUR NOTE. IF YOU WANT TO FAX A PRELIMINARY NOTE YOU WILL NEED TO TOGGLE THIS TO 'NO' IF YOU DO NOT WANT IT IN YOUR FAXED NOTE.
Shielding Size (Optional- Include Units): 3.0x3.0
Custom Shielding Preamble Text Will Not Be Included With Simple Simulations (.......... X X Y Cm): A lead shield of 0.762 mm thickness is utilized to form a molded, custom shield with a
Total Dose (Optional-Please Include Units): 5750.25cGy
Treatment Time / Fractionation (Optional- Include Units): 0.33min
Depth (Optional-Please Include Units): 1.98
Total Number Of Fractions Rx 3: 15
Pathology Override (Pathology Will Render As Diagnosis Name If Left Blank): Squamous Cell Carcinoma-Invasive
Intro Statement (Will Not Render If Left Blank): The patient is undergoing superficial radiation therapy for skin cancer and presents for weekly evaluation and management.  Per protocol and as documented on the flow sheet, the patient was questioned as to subjective redness, pruritus, pain, drainage, fatigue, or any other symptoms.  Objectively, the radiation area was evaluated with regards to erythema, atrophy, scale, crusting, erosion, ulceration, edema, purpura, tenderness, warmth, drainage, and any other findings.  The plan was extensively reviewed including the dose, and dosing schedule.  The simulation and clinical setup was also reviewed as was the external and any internal shields and based on this review the appropriateness and sufficiency of treatment was determined.
Cumulative Dose In Cgy (Optional): 5290.23
Fractionation Number: 23
Assessment: Appropriate reaction
Initial Radiation Treatment Planning (Will Render If Bill Simulation = Yes): The patient had a complete consultation regarding all applicable modalities for the treatment of their skin cancer and based on a variety of factors including the type of lesion, size, and location, the relevant medical history as well as local tissue factors, the functional status of the individual, the ability to perform necessary postoperative wound instructions and the need for simultaneous treatments as well as overall wound healing status, it was determined that the patient would begin radiation therapy treatment for skin cancer.  A full simulation and treatment device design was performed including the determination and formulation of appropriate simple and complex devices including lead shield of 0.762 mm thickness to form molded customized shielding to specifically correlate with the lesion size including treatment margin.  The custom lead shield is adequate to accommodate the appropriate applicator and provide adequate shielding around the treatment site.  The specific field applicator, shields, and devices both simple and complex as well as the specific patient setup is outlined below.  The patient was given a full consent for superficial radiation to both verbally and in writing and the full determination of patient's eligibility for treatment and selection is outlined on the patient eligibility and treatment selection form.  The specific superficial radiotherapy prescription was determined and was documented on the superficial radiotherapy prescription form.  A treatment calculation was also performed and documented on the treatment calculation form.  Based on the prescription, the patient was scheduled for a series of fractional treatments.
Dimensions-Y Axis In Cm: 1.5
Treatment Device Design After Initial Simulation Justification (Will Render If Bill For Treatment Devices = Yes): The patient is status post radiation simulation and is evaluated as to the use of additional devices for shielding and placement for radiation therapy.
Functional Status: 0 (fully active)
Limb Positioning Or Elevation: Legs Extended
Time Dose Fractionation (Optional- Include Units If Applicable): 93

## 2021-03-16 ENCOUNTER — APPOINTMENT (OUTPATIENT)
Dept: URBAN - METROPOLITAN AREA CLINIC 273 | Age: 76
Setting detail: DERMATOLOGY
End: 2021-03-16

## 2021-03-16 PROBLEM — C44.729 SQUAMOUS CELL CARCINOMA OF SKIN OF LEFT LOWER LIMB, INCLUDING HIP: Status: ACTIVE | Noted: 2021-03-16

## 2021-03-16 PROCEDURE — OTHER SUPERFICIAL RADIATION TREATMENT: OTHER

## 2021-03-16 PROCEDURE — 77427 RADIATION TX MANAGEMENT X5: CPT

## 2021-03-16 PROCEDURE — 77401 RADIATION TX DELIVERY SUPFC: CPT

## 2021-03-16 PROCEDURE — G6001 ECHO GUIDANCE RADIOTHERAPY: HCPCS

## 2021-03-16 PROCEDURE — OTHER TREATMENT REGIMEN: OTHER

## 2021-03-16 PROCEDURE — 77280 THER RAD SIMULAJ FIELD SMPL: CPT

## 2021-03-16 NOTE — PROCEDURE: SUPERFICIAL RADIATION TREATMENT
Simple Simulation Preamble Text Will Be Included With Simple Simulations (.......... Indications): Simple simulation was performed today for the following reasons:
Depth (Optional-Please Include Units): 1.98
Include Rx 3 When Rendering Additional Prescriptions: Yes
Number Of Treatment Days: 1
Fractions / Week Rx 4: 5
Daily Fractionated Dose (Optional- Include Units): 230.01cGy
[place] For Simulation Without Treatment Device Design (Simple Simulation): No
Simple Simulation Afterword Text Will Be Included With Simple Simulations (Indications............): The patient had a complete consultation regarding all applicable modalities for the treatment of their lesion and based on a variety of factors including the type of lesion, size, and location, the relevant medical history as well as local tissue factors, the functional status of the individual, the ability to perform necessary postoperative wound instructions and the need for simultaneous treatments as well as overall wound healing status, it was determined that the patient would begin radiation therapy treatment for skin cancer.  A full simulation and treatment device design was performed including the determination and formulation of appropriate simple and complex devices including lead shield of 2.286 mm thickness to form molded customized shielding to specifically correlate with the lesion size including treatment margin.  The custom lead shield is adequate to accommodate the appropriate applicator and provide adequate shielding around the treatment site.  The specific field applicator, shields, and devices both simple and complex as well as the specific patient setup is outlined below.  The patient was given a full consent for superficial radiation to both verbally and in writing and the full determination of patient's eligibility for treatment and selection is outlined on the patient eligibility and treatment selection form.  The specific superficial radiotherapy prescription was determined and was documented on the superficial radiotherapy prescription form.  A treatment calculation was also performed and documented on the treatment calculation form.  Based on the prescription, the patient was scheduled for a series of fractional treatments.
Dimensions-X Axis In Cm: 1.5
Information: Selecting Yes will display possible errors in your note based on the variables you have selected. This validation is only offered as a suggestion for you. PLEASE NOTE THAT THE VALIDATION TEXT WILL BE REMOVED WHEN YOU FINALIZE YOUR NOTE. IF YOU WANT TO FAX A PRELIMINARY NOTE YOU WILL NEED TO TOGGLE THIS TO 'NO' IF YOU DO NOT WANT IT IN YOUR FAXED NOTE.
Treatment Time In Min (Optional): 0.33
Assessment: Appropriate reaction
Total Dose (Optional-Please Include Units): 5750.25cGy
Intro Statement (Will Not Render If Left Blank): The patient is undergoing superficial radiation therapy for skin cancer and presents for weekly evaluation and management.  Per protocol and as documented on the flow sheet, the patient was questioned as to subjective redness, pruritus, pain, drainage, fatigue, or any other symptoms.  Objectively, the radiation area was evaluated with regards to erythema, atrophy, scale, crusting, erosion, ulceration, edema, purpura, tenderness, warmth, drainage, and any other findings.  The plan was extensively reviewed including the dose, and dosing schedule.  The simulation and clinical setup was also reviewed as was the external and any internal shields and based on this review the appropriateness and sufficiency of treatment was determined.
Fractions / Week Rx 2: 3
Treatment Time / Fractionation (Optional- Include Units): 0.33min
Treatment Device Design After Initial Simulation Justification (Will Render If Bill For Treatment Devices = Yes): The patient is status post radiation simulation and is evaluated as to the use of additional devices for shielding and placement for radiation therapy.
Dose / Tx In Cgy (Optional): 230.01
Custom Shielding Preamble Text Will Not Be Included With Simple Simulations (.......... X X Y Cm): A lead shield of 0.762 mm thickness is utilized to form a molded, custom shield with a
Limb Positioning Or Elevation: Legs Extended
Total Number Of Fractions Rx 4: 15
Functional Status: 0 (fully active)
Comments: RTOG 1
Total Number Of Fractions: 25
Patient Positioning: Supine
Treatment Margins In Cm: 0.8
Detail Level: Detailed
Cumulative Dose In Cgy (Optional): 5520.24
Custom Shielding Afterword Text Will Not Be Included With Simple Simulations (X X Y Cm............): port to correlate with the lesion size, including treatment margin. The custom lead shield is adequate to accommodate the appropriate applicator and provide adequate shielding around the treatment site. Additional shielding (as noted below) is used to protect sensitive, normal tissues.
Additional Prescription Justification Text: If there is any interruption in treatment exceeding 5 days please see Decay and Dose Adjustment Calculation and complete treatment under Prescription 2, 3 or 4 as appropriate.
Shielding Size (Optional- Include Units): 3.0x3.0
Fractionation Number: 24
Pathology Override (Pathology Will Render As Diagnosis Name If Left Blank): Squamous Cell Carcinoma-Invasive
Energy (Include Units): 70kV
Initial Radiation Treatment Planning (Will Render If Bill Simulation = Yes): The patient had a complete consultation regarding all applicable modalities for the treatment of their skin cancer and based on a variety of factors including the type of lesion, size, and location, the relevant medical history as well as local tissue factors, the functional status of the individual, the ability to perform necessary postoperative wound instructions and the need for simultaneous treatments as well as overall wound healing status, it was determined that the patient would begin radiation therapy treatment for skin cancer.  A full simulation and treatment device design was performed including the determination and formulation of appropriate simple and complex devices including lead shield of 0.762 mm thickness to form molded customized shielding to specifically correlate with the lesion size including treatment margin.  The custom lead shield is adequate to accommodate the appropriate applicator and provide adequate shielding around the treatment site.  The specific field applicator, shields, and devices both simple and complex as well as the specific patient setup is outlined below.  The patient was given a full consent for superficial radiation to both verbally and in writing and the full determination of patient's eligibility for treatment and selection is outlined on the patient eligibility and treatment selection form.  The specific superficial radiotherapy prescription was determined and was documented on the superficial radiotherapy prescription form.  A treatment calculation was also performed and documented on the treatment calculation form.  Based on the prescription, the patient was scheduled for a series of fractional treatments.
Field Size (Applicator): 4.0 cm
Day Of The Week Treatment Administered: Tuesday
Time Dose Fractionation (Optional- Include Units If Applicable): 93

## 2021-03-16 NOTE — PROCEDURE: TREATMENT REGIMEN
Plan: This patient has been treated today with image guided superficial radiation therapy for non-melanoma skin cancer.  Written informed consent has been previously obtained from this patient for this treatment. This consent is documented in the patient’s chart. The patient gave verbal consent to continue treatment today.\\nThe patient was treated with a specific radiation dose and setup as prescribed by the provider listed on this visit note. A Radiation Therapist performed administration of radiation under supervision of provider. The treatment parameters and cumulative dose are indicated above.\\nPrior to administering the radiation, the patient underwent a verification therapeutic radiology simulation-aided field setting defining relevant normal and abnormal target anatomy and acquiring images with high frequency ultrasound in addition to data necessary developing optimal radiation treatment process for the patient. This process includes verification of the treatment port(s) and proper treatment positioning. All treatment ports were photographed within electronic medical record. The patient’s customized lead blocking along with gross tumor volume and margin was confirmed. Considering superficial radiotherapy is clinical in setup, this requires physician and radiation therapist to clarify location interest being treated against initial images, pathology and patient anatomy. Care was taken ensuring fields treated were geometrically accurate and properly positioned using therapeutic radiology simulation-aided field setting verification per fraction. This process is also utilized to determine if any prescription or setup changes are necessary. These steps are therefore medically necessary ensuring safe and effective administration of radiation. Ongoing therapeutic radiology simulation-aided field setting verification is ordered throughout course of therapy.\\nA high frequency ultrasound image was acquired today for two-dimensional evaluation of the tumor volume and response to treatment, in addition to geometric accuracy of field placement. US depth is 1.94mm, which is +0.43mm in difference from previous imaging. The field placement and ultrasound imaging, per fraction, is separate and distinct from the initial simulation, and is an important task in providing safe administration of superficial radiation therapy. Physician evaluation of the ultrasound tumor depth will be ongoing throughout the course of treatment and is deemed medically necessary in order to ensure the efficacy of treatment and any necessary changes. Today’s image was evaluated for determination of continuation of treatment with the current plan or with necessary changes as appropriate. According to provider review of verification therapeutic radiology simulation-aided field setting and imaging, no change is required. Additionally, the use of ultrasound visualization and targeted assessment allows the patient to be able to see their cancer(s) progress, encouraging patient to complete and maintain compliance through full course of radiotherapy.\\nPer Dr. Kristopher Finnegan, continued ultrasound guidance and therapeutic radiology simulation-aided field setting verification per fraction is required for field placement, measurement of tumor depth, progress and acute effect monitoring.
Detail Level: Zone

## 2021-03-18 ENCOUNTER — APPOINTMENT (OUTPATIENT)
Dept: URBAN - METROPOLITAN AREA CLINIC 273 | Age: 76
Setting detail: DERMATOLOGY
End: 2021-03-23

## 2021-03-18 PROBLEM — C44.729 SQUAMOUS CELL CARCINOMA OF SKIN OF LEFT LOWER LIMB, INCLUDING HIP: Status: ACTIVE | Noted: 2021-03-18

## 2021-03-18 PROCEDURE — OTHER TREATMENT REGIMEN: OTHER

## 2021-03-18 PROCEDURE — 77280 THER RAD SIMULAJ FIELD SMPL: CPT

## 2021-03-18 PROCEDURE — OTHER SUPERFICIAL RADIATION TREATMENT: OTHER

## 2021-03-18 PROCEDURE — 77401 RADIATION TX DELIVERY SUPFC: CPT

## 2021-03-18 PROCEDURE — G6001 ECHO GUIDANCE RADIOTHERAPY: HCPCS

## 2021-03-18 NOTE — PROCEDURE: TREATMENT REGIMEN
Detail Level: Zone
Plan: This patient has been treated today with image guided superficial radiation therapy for non-melanoma skin cancer.  Written informed consent has been previously obtained from this patient for this treatment. This consent is documented in the patient’s chart. The patient gave verbal consent to continue treatment today.\\nThe patient was treated with a specific radiation dose and setup as prescribed by the provider listed on this visit note. A Radiation Therapist performed administration of radiation under supervision of provider. The treatment parameters and cumulative dose are indicated above.\\nPrior to administering the radiation, the patient underwent a verification therapeutic radiology simulation-aided field setting defining relevant normal and abnormal target anatomy and acquiring images with high frequency ultrasound in addition to data necessary developing optimal radiation treatment process for the patient. This process includes verification of the treatment port(s) and proper treatment positioning. All treatment ports were photographed within electronic medical record. The patient’s customized lead blocking along with gross tumor volume and margin was confirmed. Considering superficial radiotherapy is clinical in setup, this requires physician and radiation therapist to clarify location interest being treated against initial images, pathology and patient anatomy. Care was taken ensuring fields treated were geometrically accurate and properly positioned using therapeutic radiology simulation-aided field setting verification per fraction. This process is also utilized to determine if any prescription or setup changes are necessary. These steps are therefore medically necessary ensuring safe and effective administration of radiation. Ongoing therapeutic radiology simulation-aided field setting verification is ordered throughout course of therapy.\\nA high frequency ultrasound image was acquired today for two-dimensional evaluation of the tumor volume and response to treatment, in addition to geometric accuracy of field placement. US depth is 2.07mm, which is +0.13mm in difference from previous imaging. The field placement and ultrasound imaging, per fraction, is separate and distinct from the initial simulation, and is an important task in providing safe administration of superficial radiation therapy. Physician evaluation of the ultrasound tumor depth will be ongoing throughout the course of treatment and is deemed medically necessary in order to ensure the efficacy of treatment and any necessary changes. Today’s image was evaluated for determination of continuation of treatment with the current plan or with necessary changes as appropriate. According to provider review of verification therapeutic radiology simulation-aided field setting and imaging, no change is required. Additionally, the use of ultrasound visualization and targeted assessment allows the patient to be able to see their cancer(s) progress, encouraging patient to complete and maintain compliance through full course of radiotherapy.\\nPer Dr. Kristopher Finnegan, continued ultrasound guidance and therapeutic radiology simulation-aided field setting verification per fraction is required for field placement, measurement of tumor depth, progress and acute effect monitoring.\\nAdam Page, RT(T) administered treatment today.

## 2021-03-18 NOTE — PROCEDURE: SUPERFICIAL RADIATION TREATMENT
Daily Fractionated Dose (Optional- Include Units): 230.01cGy
Bill For Simulation And Treatment Device Design: No
Fractionation Number: 25
Fractions / Week Rx 3: 5
Patient Positioning: Supine
Custom Shielding Afterword Text Will Not Be Included With Simple Simulations (X X Y Cm............): port to correlate with the lesion size, including treatment margin. The custom lead shield is adequate to accommodate the appropriate applicator and provide adequate shielding around the treatment site. Additional shielding (as noted below) is used to protect sensitive, normal tissues.
Validate Note Data (See Information Below): Yes
Shielding Size (Optional- Include Units): 3.0x3.0
Simple Simulation Afterword Text Will Be Included With Simple Simulations (Indications............): The patient had a complete consultation regarding all applicable modalities for the treatment of their lesion and based on a variety of factors including the type of lesion, size, and location, the relevant medical history as well as local tissue factors, the functional status of the individual, the ability to perform necessary postoperative wound instructions and the need for simultaneous treatments as well as overall wound healing status, it was determined that the patient would begin radiation therapy treatment for skin cancer.  A full simulation and treatment device design was performed including the determination and formulation of appropriate simple and complex devices including lead shield of 2.286 mm thickness to form molded customized shielding to specifically correlate with the lesion size including treatment margin.  The custom lead shield is adequate to accommodate the appropriate applicator and provide adequate shielding around the treatment site.  The specific field applicator, shields, and devices both simple and complex as well as the specific patient setup is outlined below.  The patient was given a full consent for superficial radiation to both verbally and in writing and the full determination of patient's eligibility for treatment and selection is outlined on the patient eligibility and treatment selection form.  The specific superficial radiotherapy prescription was determined and was documented on the superficial radiotherapy prescription form.  A treatment calculation was also performed and documented on the treatment calculation form.  Based on the prescription, the patient was scheduled for a series of fractional treatments.
Energy (Include Units): 70kV
Fractions / Week Rx 2: 3
Intro Statement (Will Not Render If Left Blank): The patient is undergoing superficial radiation therapy for skin cancer and presents for weekly evaluation and management.  Per protocol and as documented on the flow sheet, the patient was questioned as to subjective redness, pruritus, pain, drainage, fatigue, or any other symptoms.  Objectively, the radiation area was evaluated with regards to erythema, atrophy, scale, crusting, erosion, ulceration, edema, purpura, tenderness, warmth, drainage, and any other findings.  The plan was extensively reviewed including the dose, and dosing schedule.  The simulation and clinical setup was also reviewed as was the external and any internal shields and based on this review the appropriateness and sufficiency of treatment was determined.
Field Size (Applicator): 4.0 cm
Dose / Tx In Cgy (Optional): 230.01
Dimensions-Y Axis In Cm: 1.5
Prescription Used: 1
Total Dose (Optional-Please Include Units): 5750.25cGy
Initial Radiation Treatment Planning (Will Render If Bill Simulation = Yes): The patient had a complete consultation regarding all applicable modalities for the treatment of their skin cancer and based on a variety of factors including the type of lesion, size, and location, the relevant medical history as well as local tissue factors, the functional status of the individual, the ability to perform necessary postoperative wound instructions and the need for simultaneous treatments as well as overall wound healing status, it was determined that the patient would begin radiation therapy treatment for skin cancer.  A full simulation and treatment device design was performed including the determination and formulation of appropriate simple and complex devices including lead shield of 0.762 mm thickness to form molded customized shielding to specifically correlate with the lesion size including treatment margin.  The custom lead shield is adequate to accommodate the appropriate applicator and provide adequate shielding around the treatment site.  The specific field applicator, shields, and devices both simple and complex as well as the specific patient setup is outlined below.  The patient was given a full consent for superficial radiation to both verbally and in writing and the full determination of patient's eligibility for treatment and selection is outlined on the patient eligibility and treatment selection form.  The specific superficial radiotherapy prescription was determined and was documented on the superficial radiotherapy prescription form.  A treatment calculation was also performed and documented on the treatment calculation form.  Based on the prescription, the patient was scheduled for a series of fractional treatments.
Day Of The Week Treatment Administered: Thursday
Treatment Margins In Cm: 0.8
Pathology Override (Pathology Will Render As Diagnosis Name If Left Blank): Squamous Cell Carcinoma-Invasive
Total Number Of Fractions Rx 4: 15
Functional Status: 0 (fully active)
Cumulative Dose In Cgy (Optional): 5750.25
Simple Simulation Preamble Text Will Be Included With Simple Simulations (.......... Indications): Simple simulation was performed today for the following reasons:
Treatment Time In Min (Optional): 0.33
Treatment Time / Fractionation (Optional- Include Units): 0.33min
Assessment: Appropriate reaction
Fractionation Number (Evaluation): 24
Treatment Device Design After Initial Simulation Justification (Will Render If Bill For Treatment Devices = Yes): The patient is status post radiation simulation and is evaluated as to the use of additional devices for shielding and placement for radiation therapy.
Depth (Optional-Please Include Units): 1.98
Additional Prescription Justification Text: If there is any interruption in treatment exceeding 5 days please see Decay and Dose Adjustment Calculation and complete treatment under Prescription 2, 3 or 4 as appropriate.
Limb Positioning Or Elevation: Legs Extended
Time Dose Fractionation (Optional- Include Units If Applicable): 93
Detail Level: Detailed
Information: Selecting Yes will display possible errors in your note based on the variables you have selected. This validation is only offered as a suggestion for you. PLEASE NOTE THAT THE VALIDATION TEXT WILL BE REMOVED WHEN YOU FINALIZE YOUR NOTE. IF YOU WANT TO FAX A PRELIMINARY NOTE YOU WILL NEED TO TOGGLE THIS TO 'NO' IF YOU DO NOT WANT IT IN YOUR FAXED NOTE.
Comments: RTOG 1
Custom Shielding Preamble Text Will Not Be Included With Simple Simulations (.......... X X Y Cm): A lead shield of 0.762 mm thickness is utilized to form a molded, custom shield with a

## 2021-03-21 ENCOUNTER — APPOINTMENT (OUTPATIENT)
Dept: URBAN - METROPOLITAN AREA CLINIC 273 | Age: 76
Setting detail: DERMATOLOGY
End: 2021-03-31

## 2021-03-21 PROCEDURE — 77336 RADIATION PHYSICS CONSULT: CPT

## 2021-04-29 ENCOUNTER — APPOINTMENT (OUTPATIENT)
Dept: URBAN - METROPOLITAN AREA CLINIC 273 | Age: 76
Setting detail: DERMATOLOGY
End: 2021-05-07

## 2021-04-29 PROBLEM — C44.729 SQUAMOUS CELL CARCINOMA OF SKIN OF LEFT LOWER LIMB, INCLUDING HIP: Status: ACTIVE | Noted: 2021-04-29

## 2021-04-29 PROCEDURE — G6001 ECHO GUIDANCE RADIOTHERAPY: HCPCS

## 2021-04-29 PROCEDURE — OTHER SUPERFICIAL RADIATION TREATMENT: OTHER

## 2021-04-29 PROCEDURE — 99213 OFFICE O/P EST LOW 20 MIN: CPT

## 2021-05-03 ENCOUNTER — APPOINTMENT (OUTPATIENT)
Dept: URBAN - METROPOLITAN AREA CLINIC 273 | Age: 76
Setting detail: DERMATOLOGY
End: 2021-05-07

## 2021-05-03 PROBLEM — C44.729 SQUAMOUS CELL CARCINOMA OF SKIN OF LEFT LOWER LIMB, INCLUDING HIP: Status: ACTIVE | Noted: 2021-05-03

## 2021-05-03 PROCEDURE — 77300 RADIATION THERAPY DOSE PLAN: CPT

## 2021-05-03 PROCEDURE — OTHER SUPERFICIAL RADIATION TREATMENT: OTHER

## 2021-05-03 NOTE — PROCEDURE: SUPERFICIAL RADIATION TREATMENT
Energy (Optional-Please Include Units): 70kV
Treatment Time / Fractionation (Optional- Include Units): 0.33min
Bill For Treatment Devices Only: No
Fractionation Number (Evaluation): 25
Port Dimensions-Y Axis In Cm: 3
Treatment Device Design After Initial Simulation Justification (Will Render If Bill For Treatment Devices = Yes): The patient is status post radiation simulation and is evaluated as to the use of additional devices for shielding and placement for radiation therapy.
Computed Treatment Time In Min (Will Render The Same As Calculated Treatment Time If Left Blank): 0.33
Treatment Margins In Cm: 0.8
Number Of Treatment Days: 1
Include Rx 2 When Rendering Additional Prescriptions: Yes
Depth (Optional-Please Include Units): 1.98
Intro Statement (Will Not Render If Left Blank): Per the request of Dr. Kristopher Finnegan, patient was seen today for Superficial Radiation Therapy management.  A high frequency ultrasound image was acquired prior to evaluation today for three-dimensional evaluation of tumor volume and response to treatment (CPT® ). \\n\\nApproximately 6 weeks after finishing SRT, evaluation and management of SRT based on prescription and cumulative dose for reaction and response to radiation reveals some healing and response, however, a small raised lesion is seen on the surface of the skin, and the ultrasound imaging reveals residual lesion.  A boost of four fractions is recommended.
Total Dose (Optional-Please Include Units) Rx 2: 893.64
Custom Shielding Afterword Text Will Not Be Included With Simple Simulations (X X Y Cm............): port to correlate with the lesion size, including treatment margin. The custom lead shield is adequate to accommodate the appropriate applicator and provide adequate shielding around the treatment site. Additional shielding (as noted below) is used to protect sensitive, normal tissues.
Shielding Size (Optional- Include Units) Rx 2: 3.0x3.0
Pathology Override (Pathology Will Render As Diagnosis Name If Left Blank): Squamous Cell Carcinoma-Invasive
Additional Prescription Justification Text: If there is any interruption in treatment exceeding 5 days please see Decay and Dose Adjustment Calculation and complete treatment under Prescription 2, 3 or 4 as appropriate.
Day Of The Week Treatment Administered: Thursday
Field Size (Applicator): 4.0 cm
Assessment: Appropriate reaction
Detail Level: Detailed
Initial Radiation Treatment Planning (Will Render If Bill Simulation = Yes): The patient had a complete consultation regarding all applicable modalities for the treatment of their skin cancer and based on a variety of factors including the type of lesion, size, and location, the relevant medical history as well as local tissue factors, the functional status of the individual, the ability to perform necessary postoperative wound instructions and the need for simultaneous treatments as well as overall wound healing status, it was determined that the patient would begin radiation therapy treatment for skin cancer.  A full simulation and treatment device design was performed including the determination and formulation of appropriate simple and complex devices including lead shield of 0.762 mm thickness to form molded customized shielding to specifically correlate with the lesion size including treatment margin.  The custom lead shield is adequate to accommodate the appropriate applicator and provide adequate shielding around the treatment site.  The specific field applicator, shields, and devices both simple and complex as well as the specific patient setup is outlined below.  The patient was given a full consent for superficial radiation to both verbally and in writing and the full determination of patient's eligibility for treatment and selection is outlined on the patient eligibility and treatment selection form.  The specific superficial radiotherapy prescription was determined and was documented on the superficial radiotherapy prescription form.  A treatment calculation was also performed and documented on the treatment calculation form.  Based on the prescription, the patient was scheduled for a series of fractional treatments.
Energy (Optional-Please Include Units) Rx 2: 100kV
Time Dose Fractionation (Optional- Include Units If Applicable) Rx 2: 15
Fractions / Week Rx 4: 5
Time Dose Fractionation (Optional- Include Units If Applicable): 93
Information: Selecting Yes will display possible errors in your note based on the variables you have selected. This validation is only offered as a suggestion for you. PLEASE NOTE THAT THE VALIDATION TEXT WILL BE REMOVED WHEN YOU FINALIZE YOUR NOTE. IF YOU WANT TO FAX A PRELIMINARY NOTE YOU WILL NEED TO TOGGLE THIS TO 'NO' IF YOU DO NOT WANT IT IN YOUR FAXED NOTE.
Total Number Of Fractions Rx 2: 4
Daily Fractionated Dose (Optional- Include Units) Rx 2: 223.41cGy
Dose / Tx In Cgy (Optional): 230.01
Dimensions-X Axis In Cm: 1.5
Functional Status: 0 (fully active)
Simple Simulation Preamble Text Will Be Included With Simple Simulations (.......... Indications): Simple simulation was performed today for the following reasons:
Limb Positioning Or Elevation: Legs Extended
Dose Per Fractionation In Cgy (Optional): 230.01cGy
Cumulative Dose In Cgy (Optional): 5750.25
Simple Simulation Afterword Text Will Be Included With Simple Simulations (Indications............): The patient had a complete consultation regarding all applicable modalities for the treatment of their lesion and based on a variety of factors including the type of lesion, size, and location, the relevant medical history as well as local tissue factors, the functional status of the individual, the ability to perform necessary postoperative wound instructions and the need for simultaneous treatments as well as overall wound healing status, it was determined that the patient would begin radiation therapy treatment for skin cancer.  A full simulation and treatment device design was performed including the determination and formulation of appropriate simple and complex devices including lead shield of 2.286 mm thickness to form molded customized shielding to specifically correlate with the lesion size including treatment margin.  The custom lead shield is adequate to accommodate the appropriate applicator and provide adequate shielding around the treatment site.  The specific field applicator, shields, and devices both simple and complex as well as the specific patient setup is outlined below.  The patient was given a full consent for superficial radiation to both verbally and in writing and the full determination of patient's eligibility for treatment and selection is outlined on the patient eligibility and treatment selection form.  The specific superficial radiotherapy prescription was determined and was documented on the superficial radiotherapy prescription form.  A treatment calculation was also performed and documented on the treatment calculation form.  Based on the prescription, the patient was scheduled for a series of fractional treatments.
Patient Positioning: Supine
Comments: RTOG 0
Total Dose (Optional-Please Include Units): 5750.25cGy
Custom Shielding Preamble Text Will Not Be Included With Simple Simulations (.......... X X Y Cm): A lead shield of 0.762 mm thickness is utilized to form a molded, custom shield with a

## 2021-05-10 ENCOUNTER — APPOINTMENT (OUTPATIENT)
Dept: URBAN - METROPOLITAN AREA CLINIC 273 | Age: 76
Setting detail: DERMATOLOGY
End: 2021-05-10

## 2021-05-10 PROBLEM — C44.729 SQUAMOUS CELL CARCINOMA OF SKIN OF LEFT LOWER LIMB, INCLUDING HIP: Status: ACTIVE | Noted: 2021-05-10

## 2021-05-10 PROBLEM — C44.92 SQUAMOUS CELL CARCINOMA OF SKIN, UNSPECIFIED: Status: ACTIVE | Noted: 2021-05-10

## 2021-05-10 PROCEDURE — G6001 ECHO GUIDANCE RADIOTHERAPY: HCPCS

## 2021-05-10 PROCEDURE — OTHER SUPERFICIAL RADIATION TREATMENT: OTHER

## 2021-05-10 PROCEDURE — OTHER TREATMENT REGIMEN: OTHER

## 2021-05-10 PROCEDURE — 77401 RADIATION TX DELIVERY SUPFC: CPT

## 2021-05-10 PROCEDURE — 77280 THER RAD SIMULAJ FIELD SMPL: CPT

## 2021-05-10 NOTE — PROCEDURE: SUPERFICIAL RADIATION TREATMENT
Total Number Of Fractions: 25
Shielding Size (Optional- Include Units): 3.0x3.0
Computed Treatment Time In Min (Will Render The Same As Calculated Treatment Time If Left Blank): 0.33
Field Size (Applicator): 4.0 cm
Dimensions-X Axis In Cm: 1.5
Include Rx 4 When Rendering Additional Prescriptions: No
Daily Fractionated Dose (Optional- Include Units) Rx 2: 223.41cGy
Fractionation Number: 1
Energy (Include Units): 100kV
Initial Radiation Treatment Planning (Will Render If Bill Simulation = Yes): The patient had a complete consultation regarding all applicable modalities for the treatment of their skin cancer and based on a variety of factors including the type of lesion, size, and location, the relevant medical history as well as local tissue factors, the functional status of the individual, the ability to perform necessary postoperative wound instructions and the need for simultaneous treatments as well as overall wound healing status, it was determined that the patient would begin radiation therapy treatment for skin cancer.  A full simulation and treatment device design was performed including the determination and formulation of appropriate simple and complex devices including lead shield of 0.762 mm thickness to form molded customized shielding to specifically correlate with the lesion size including treatment margin.  The custom lead shield is adequate to accommodate the appropriate applicator and provide adequate shielding around the treatment site.  The specific field applicator, shields, and devices both simple and complex as well as the specific patient setup is outlined below.  The patient was given a full consent for superficial radiation to both verbally and in writing and the full determination of patient's eligibility for treatment and selection is outlined on the patient eligibility and treatment selection form.  The specific superficial radiotherapy prescription was determined and was documented on the superficial radiotherapy prescription form.  A treatment calculation was also performed and documented on the treatment calculation form.  Based on the prescription, the patient was scheduled for a series of fractional treatments.
Total Dose (Optional-Please Include Units): 5750.25cGy
Intro Statement (Will Not Render If Left Blank): Per the request of Dr. Kristopher Finnegan, patient was seen today for Superficial Radiation Therapy management.  A high frequency ultrasound image was acquired prior to evaluation today for three-dimensional evaluation of tumor volume and response to treatment (CPT® ). \\n\\nApproximately 6 weeks after finishing SRT, evaluation and management of SRT based on prescription and cumulative dose for reaction and response to radiation reveals some healing and response, however, a small raised lesion is seen on the surface of the skin, and the ultrasound imaging reveals residual lesion.  A boost of four fractions is recommended.
Port Dimensions-Y Axis In Cm: 3
Information: Selecting Yes will display possible errors in your note based on the variables you have selected. This validation is only offered as a suggestion for you. PLEASE NOTE THAT THE VALIDATION TEXT WILL BE REMOVED WHEN YOU FINALIZE YOUR NOTE. IF YOU WANT TO FAX A PRELIMINARY NOTE YOU WILL NEED TO TOGGLE THIS TO 'NO' IF YOU DO NOT WANT IT IN YOUR FAXED NOTE.
Pathology Override (Pathology Will Render As Diagnosis Name If Left Blank): Squamous Cell Carcinoma-Invasive
Total Dose (Optional-Please Include Units) Rx 2: 893.64
Prescription Used: 2
Simple Simulation Preamble Text Will Be Included With Simple Simulations (.......... Indications): Simple simulation was performed today for the following reasons:
Include Rx 2 When Rendering Additional Prescriptions: Yes
Time Dose Fractionation (Optional- Include Units If Applicable): 93
Detail Level: Detailed
Energy (Optional-Please Include Units): 70kV
Cumulative Dose In Cgy (Optional): 5973.66
Functional Status: 0 (fully active)
Additional Prescription Justification Text: If there is any interruption in treatment exceeding 5 days please see Decay and Dose Adjustment Calculation and complete treatment under Prescription 2, 3 or 4 as appropriate.
Total Number Of Fractions Rx 2: 4
Simple Simulation Afterword Text Will Be Included With Simple Simulations (Indications............): The patient had a complete consultation regarding all applicable modalities for the treatment of their lesion and based on a variety of factors including the type of lesion, size, and location, the relevant medical history as well as local tissue factors, the functional status of the individual, the ability to perform necessary postoperative wound instructions and the need for simultaneous treatments as well as overall wound healing status, it was determined that the patient would begin radiation therapy treatment for skin cancer.  A full simulation and treatment device design was performed including the determination and formulation of appropriate simple and complex devices including lead shield of 2.286 mm thickness to form molded customized shielding to specifically correlate with the lesion size including treatment margin.  The custom lead shield is adequate to accommodate the appropriate applicator and provide adequate shielding around the treatment site.  The specific field applicator, shields, and devices both simple and complex as well as the specific patient setup is outlined below.  The patient was given a full consent for superficial radiation to both verbally and in writing and the full determination of patient's eligibility for treatment and selection is outlined on the patient eligibility and treatment selection form.  The specific superficial radiotherapy prescription was determined and was documented on the superficial radiotherapy prescription form.  A treatment calculation was also performed and documented on the treatment calculation form.  Based on the prescription, the patient was scheduled for a series of fractional treatments.
Daily Fractionated Dose (Optional- Include Units): 230.01cGy
Total Number Of Fractions Rx 4: 15
Custom Shielding Preamble Text Will Not Be Included With Simple Simulations (.......... X X Y Cm): A lead shield of 0.762 mm thickness is utilized to form a molded, custom shield with a
Limb Positioning Or Elevation: Legs Extended
Day Of The Week Treatment Administered: Monday
Fractions / Week Rx 4: 5
Dose / Tx In Cgy (Optional): 223.41
Treatment Device Design After Initial Simulation Justification (Will Render If Bill For Treatment Devices = Yes): The patient is status post radiation simulation and is evaluated as to the use of additional devices for shielding and placement for radiation therapy.
Treatment Time / Fractionation (Optional- Include Units): 0.33min
Patient Positioning: Supine
Custom Shielding Afterword Text Will Not Be Included With Simple Simulations (X X Y Cm............): port to correlate with the lesion size, including treatment margin. The custom lead shield is adequate to accommodate the appropriate applicator and provide adequate shielding around the treatment site. Additional shielding (as noted below) is used to protect sensitive, normal tissues.
Comments: RTOG 0
Assessment: Appropriate reaction
Depth (Optional-Please Include Units): 1.98
Treatment Margins In Cm: 0.8

## 2021-05-10 NOTE — PROCEDURE: TREATMENT REGIMEN
Plan: This patient has been treated today with image guided superficial radiation therapy for non-melanoma skin cancer.  Written informed consent has been previously obtained from this patient for this treatment. This consent is documented in the patient’s chart. The patient gave verbal consent to continue treatment today.\\nThe patient was treated with a specific radiation dose and setup as prescribed by the provider listed on this visit note. A Radiation Therapist performed administration of radiation under supervision of provider. The treatment parameters and cumulative dose are indicated above.\\nPrior to administering the radiation, the patient underwent a verification therapeutic radiology simulation-aided field setting defining relevant normal and abnormal target anatomy and acquiring images with high frequency ultrasound in addition to data necessary developing optimal radiation treatment process for the patient. This process includes verification of the treatment port(s) and proper treatment positioning. All treatment ports were photographed within electronic medical record. The patient’s customized lead blocking along with gross tumor volume and margin was confirmed. Considering superficial radiotherapy is clinical in setup, this requires physician and radiation therapist to clarify location interest being treated against initial images, pathology and patient anatomy. Care was taken ensuring fields treated were geometrically accurate and properly positioned using therapeutic radiology simulation-aided field setting verification per fraction. This process is also utilized to determine if any prescription or setup changes are necessary. These steps are therefore medically necessary ensuring safe and effective administration of radiation. Ongoing therapeutic radiology simulation-aided field setting verification is ordered throughout course of therapy.\\nA high frequency ultrasound image was acquired today for two-dimensional evaluation of the tumor volume and response to treatment, in addition to geometric accuracy of field placement. US depth is 1.74mm. The field placement and ultrasound imaging, per fraction, is separate and distinct from the initial simulation, and is an important task in providing safe administration of superficial radiation therapy. Physician evaluation of the ultrasound tumor depth will be ongoing throughout the course of treatment and is deemed medically necessary in order to ensure the efficacy of treatment and any necessary changes. Today’s image was evaluated for determination of continuation of treatment with the current plan or with necessary changes as appropriate. According to provider review of verification therapeutic radiology simulation-aided field setting and imaging, no change is required. Additionally, the use of ultrasound visualization and targeted assessment allows the patient to be able to see their cancer(s) progress, encouraging patient to complete and maintain compliance through full course of radiotherapy.\\nPer Dr. Kristopher Finnegan, continued ultrasound guidance and therapeutic radiology simulation-aided field setting verification per fraction is required for field placement, measurement of tumor depth, progress and acute effect monitoring.
Detail Level: Zone

## 2021-05-11 ENCOUNTER — APPOINTMENT (OUTPATIENT)
Dept: URBAN - METROPOLITAN AREA CLINIC 273 | Age: 76
Setting detail: DERMATOLOGY
End: 2021-05-11

## 2021-05-11 PROBLEM — C44.729 SQUAMOUS CELL CARCINOMA OF SKIN OF LEFT LOWER LIMB, INCLUDING HIP: Status: ACTIVE | Noted: 2021-05-11

## 2021-05-11 PROCEDURE — OTHER SUPERFICIAL RADIATION TREATMENT: OTHER

## 2021-05-11 PROCEDURE — OTHER TREATMENT REGIMEN: OTHER

## 2021-05-11 PROCEDURE — G6001 ECHO GUIDANCE RADIOTHERAPY: HCPCS

## 2021-05-11 PROCEDURE — 77401 RADIATION TX DELIVERY SUPFC: CPT

## 2021-05-11 PROCEDURE — 77280 THER RAD SIMULAJ FIELD SMPL: CPT

## 2021-05-11 NOTE — PROCEDURE: TREATMENT REGIMEN
Plan: This patient has been treated today with image guided superficial radiation therapy for non-melanoma skin cancer.  Written informed consent has been previously obtained from this patient for this treatment. This consent is documented in the patient’s chart. The patient gave verbal consent to continue treatment today.\\nThe patient was treated with a specific radiation dose and setup as prescribed by the provider listed on this visit note. A Radiation Therapist performed administration of radiation under supervision of provider. The treatment parameters and cumulative dose are indicated above.\\nPrior to administering the radiation, the patient underwent a verification therapeutic radiology simulation-aided field setting defining relevant normal and abnormal target anatomy and acquiring images with high frequency ultrasound in addition to data necessary developing optimal radiation treatment process for the patient. This process includes verification of the treatment port(s) and proper treatment positioning. All treatment ports were photographed within electronic medical record. The patient’s customized lead blocking along with gross tumor volume and margin was confirmed. Considering superficial radiotherapy is clinical in setup, this requires physician and radiation therapist to clarify location interest being treated against initial images, pathology and patient anatomy. Care was taken ensuring fields treated were geometrically accurate and properly positioned using therapeutic radiology simulation-aided field setting verification per fraction. This process is also utilized to determine if any prescription or setup changes are necessary. These steps are therefore medically necessary ensuring safe and effective administration of radiation. Ongoing therapeutic radiology simulation-aided field setting verification is ordered throughout course of therapy.\\nA high frequency ultrasound image was acquired today for two-dimensional evaluation of the tumor volume and response to treatment, in addition to geometric accuracy of field placement. US depth is 1.86mm, which is +0.08mm in difference from previous imaging. The field placement and ultrasound imaging, per fraction, is separate and distinct from the initial simulation, and is an important task in providing safe administration of superficial radiation therapy. Physician evaluation of the ultrasound tumor depth will be ongoing throughout the course of treatment and is deemed medically necessary in order to ensure the efficacy of treatment and any necessary changes. Today’s image was evaluated for determination of continuation of treatment with the current plan or with necessary changes as appropriate. According to provider review of verification therapeutic radiology simulation-aided field setting and imaging, no change is required. Additionally, the use of ultrasound visualization and targeted assessment allows the patient to be able to see their cancer(s) progress, encouraging patient to complete and maintain compliance through full course of radiotherapy.\\nPer Dr. Kristopher Finnegan, continued ultrasound guidance and therapeutic radiology simulation-aided field setting verification per fraction is required for field placement, measurement of tumor depth, progress and acute effect monitoring.
Detail Level: Zone

## 2021-05-11 NOTE — PROCEDURE: SUPERFICIAL RADIATION TREATMENT
Include Rx 2 When Rendering Additional Prescriptions: Yes
Field Size (Applicator): 4.0 cm
Information: Selecting Yes will display possible errors in your note based on the variables you have selected. This validation is only offered as a suggestion for you. PLEASE NOTE THAT THE VALIDATION TEXT WILL BE REMOVED WHEN YOU FINALIZE YOUR NOTE. IF YOU WANT TO FAX A PRELIMINARY NOTE YOU WILL NEED TO TOGGLE THIS TO 'NO' IF YOU DO NOT WANT IT IN YOUR FAXED NOTE.
Dimensions-X Axis In Cm: 1.5
Treatment Device Design After Initial Simulation Justification (Will Render If Bill For Treatment Devices = Yes): The patient is status post radiation simulation and is evaluated as to the use of additional devices for shielding and placement for radiation therapy.
Port Dimensions-Y Axis In Cm: 3
Assessment: Appropriate reaction
Dose / Tx In Cgy (Optional): 223.41
Energy (Optional-Please Include Units): 70kV
Shielding Size (Optional- Include Units) Rx 2: 3.0x3.0
Include Rx 4 When Rendering Additional Prescriptions: No
Number Of Days Off Treatment: 1
Initial Radiation Treatment Planning (Will Render If Bill Simulation = Yes): The patient had a complete consultation regarding all applicable modalities for the treatment of their skin cancer and based on a variety of factors including the type of lesion, size, and location, the relevant medical history as well as local tissue factors, the functional status of the individual, the ability to perform necessary postoperative wound instructions and the need for simultaneous treatments as well as overall wound healing status, it was determined that the patient would begin radiation therapy treatment for skin cancer.  A full simulation and treatment device design was performed including the determination and formulation of appropriate simple and complex devices including lead shield of 0.762 mm thickness to form molded customized shielding to specifically correlate with the lesion size including treatment margin.  The custom lead shield is adequate to accommodate the appropriate applicator and provide adequate shielding around the treatment site.  The specific field applicator, shields, and devices both simple and complex as well as the specific patient setup is outlined below.  The patient was given a full consent for superficial radiation to both verbally and in writing and the full determination of patient's eligibility for treatment and selection is outlined on the patient eligibility and treatment selection form.  The specific superficial radiotherapy prescription was determined and was documented on the superficial radiotherapy prescription form.  A treatment calculation was also performed and documented on the treatment calculation form.  Based on the prescription, the patient was scheduled for a series of fractional treatments.
Time Dose Fractionation (Optional- Include Units If Applicable) Rx 2: 15
Day Of The Week Treatment Administered: Tuesday
Cumulative Dose In Cgy (Optional): 6197.07
Energy (Optional-Please Include Units) Rx 2: 100kV
Simple Simulation Preamble Text Will Be Included With Simple Simulations (.......... Indications): Simple simulation was performed today for the following reasons:
Functional Status: 0 (fully active)
Total Number Of Fractions Rx 2: 4
Custom Shielding Preamble Text Will Not Be Included With Simple Simulations (.......... X X Y Cm): A lead shield of 0.762 mm thickness is utilized to form a molded, custom shield with a
Treatment Time / Fractionation (Optional- Include Units): 0.33min
Limb Positioning Or Elevation: Legs Extended
Fractionation Number: 2
Dose Per Fractionation In Cgy (Optional): 230.01cGy
Comments: RTOG 0
Depth (Optional-Please Include Units): 1.98
Computed Treatment Time In Min (Will Render The Same As Calculated Treatment Time If Left Blank): 0.33
Time Dose Fractionation (Optional- Include Units If Applicable): 93
Daily Fractionated Dose (Optional- Include Units) Rx 2: 223.41cGy
Total Dose (Optional-Please Include Units) Rx 2: 893.64
Pathology Override (Pathology Will Render As Diagnosis Name If Left Blank): Squamous Cell Carcinoma-Invasive
Total Number Of Fractions: 25
Treatment Margins In Cm: 0.8
Intro Statement (Will Not Render If Left Blank): Per the request of Dr. Kristopher Finnegan, patient was seen today for Superficial Radiation Therapy management.  A high frequency ultrasound image was acquired prior to evaluation today for three-dimensional evaluation of tumor volume and response to treatment (CPT® ). \\n\\nApproximately 6 weeks after finishing SRT, evaluation and management of SRT based on prescription and cumulative dose for reaction and response to radiation reveals some healing and response, however, a small raised lesion is seen on the surface of the skin, and the ultrasound imaging reveals residual lesion.  A boost of four fractions is recommended.
Fractions / Week Rx 4: 5
Patient Positioning: Supine
Total Dose (Optional-Please Include Units): 5750.25cGy
Additional Prescription Justification Text: If there is any interruption in treatment exceeding 5 days please see Decay and Dose Adjustment Calculation and complete treatment under Prescription 2, 3 or 4 as appropriate.
Custom Shielding Afterword Text Will Not Be Included With Simple Simulations (X X Y Cm............): port to correlate with the lesion size, including treatment margin. The custom lead shield is adequate to accommodate the appropriate applicator and provide adequate shielding around the treatment site. Additional shielding (as noted below) is used to protect sensitive, normal tissues.
Simple Simulation Afterword Text Will Be Included With Simple Simulations (Indications............): The patient had a complete consultation regarding all applicable modalities for the treatment of their lesion and based on a variety of factors including the type of lesion, size, and location, the relevant medical history as well as local tissue factors, the functional status of the individual, the ability to perform necessary postoperative wound instructions and the need for simultaneous treatments as well as overall wound healing status, it was determined that the patient would begin radiation therapy treatment for skin cancer.  A full simulation and treatment device design was performed including the determination and formulation of appropriate simple and complex devices including lead shield of 2.286 mm thickness to form molded customized shielding to specifically correlate with the lesion size including treatment margin.  The custom lead shield is adequate to accommodate the appropriate applicator and provide adequate shielding around the treatment site.  The specific field applicator, shields, and devices both simple and complex as well as the specific patient setup is outlined below.  The patient was given a full consent for superficial radiation to both verbally and in writing and the full determination of patient's eligibility for treatment and selection is outlined on the patient eligibility and treatment selection form.  The specific superficial radiotherapy prescription was determined and was documented on the superficial radiotherapy prescription form.  A treatment calculation was also performed and documented on the treatment calculation form.  Based on the prescription, the patient was scheduled for a series of fractional treatments.
Detail Level: Detailed

## 2021-05-12 ENCOUNTER — APPOINTMENT (OUTPATIENT)
Dept: URBAN - METROPOLITAN AREA CLINIC 273 | Age: 76
Setting detail: DERMATOLOGY
End: 2021-05-12

## 2021-05-12 PROBLEM — C44.729 SQUAMOUS CELL CARCINOMA OF SKIN OF LEFT LOWER LIMB, INCLUDING HIP: Status: ACTIVE | Noted: 2021-05-12

## 2021-05-12 PROCEDURE — 77280 THER RAD SIMULAJ FIELD SMPL: CPT

## 2021-05-12 PROCEDURE — G6001 ECHO GUIDANCE RADIOTHERAPY: HCPCS

## 2021-05-12 PROCEDURE — 77401 RADIATION TX DELIVERY SUPFC: CPT

## 2021-05-12 PROCEDURE — OTHER TREATMENT REGIMEN: OTHER

## 2021-05-12 PROCEDURE — OTHER SUPERFICIAL RADIATION TREATMENT: OTHER

## 2021-05-12 NOTE — PROCEDURE: SUPERFICIAL RADIATION TREATMENT
Field Size (Applicator): 4.0 cm
Treatment Margins In Cm: 0.8
Render Patient Eligibility And Selection In Note?: No
Dimensions-X Axis In Cm: 1.5
Validate Note Data (See Information Below): Yes
Number Of Treatment Days: 1
Dose / Tx In Cgy (Optional): 223.41
Additional Prescription Justification Text: If there is any interruption in treatment exceeding 5 days please see Decay and Dose Adjustment Calculation and complete treatment under Prescription 2, 3 or 4 as appropriate.
Shielding Size (Optional- Include Units): 3.0x3.0
Daily Fractionated Dose (Optional- Include Units) Rx 2: 223.41cGy
Port Dimensions-X Axis In Cm: 3
Comments: RTOG 0
Treatment Time / Fractionation (Optional- Include Units): 0.33min
Initial Radiation Treatment Planning (Will Render If Bill Simulation = Yes): The patient had a complete consultation regarding all applicable modalities for the treatment of their skin cancer and based on a variety of factors including the type of lesion, size, and location, the relevant medical history as well as local tissue factors, the functional status of the individual, the ability to perform necessary postoperative wound instructions and the need for simultaneous treatments as well as overall wound healing status, it was determined that the patient would begin radiation therapy treatment for skin cancer.  A full simulation and treatment device design was performed including the determination and formulation of appropriate simple and complex devices including lead shield of 0.762 mm thickness to form molded customized shielding to specifically correlate with the lesion size including treatment margin.  The custom lead shield is adequate to accommodate the appropriate applicator and provide adequate shielding around the treatment site.  The specific field applicator, shields, and devices both simple and complex as well as the specific patient setup is outlined below.  The patient was given a full consent for superficial radiation to both verbally and in writing and the full determination of patient's eligibility for treatment and selection is outlined on the patient eligibility and treatment selection form.  The specific superficial radiotherapy prescription was determined and was documented on the superficial radiotherapy prescription form.  A treatment calculation was also performed and documented on the treatment calculation form.  Based on the prescription, the patient was scheduled for a series of fractional treatments.
Fractions / Week Rx 3: 5
Time Dose Fractionation (Optional- Include Units If Applicable): 93
Cumulative Dose In Cgy (Optional): 6420.48
Custom Shielding Preamble Text Will Not Be Included With Simple Simulations (.......... X X Y Cm): A lead shield of 0.762 mm thickness is utilized to form a molded, custom shield with a
Limb Positioning Or Elevation: Legs Extended
Energy (Optional-Please Include Units): 70kV
Computed Treatment Time In Min (Will Render The Same As Calculated Treatment Time If Left Blank): 0.33
Intro Statement (Will Not Render If Left Blank): Per the request of Dr. Kristopher Finnegan, patient was seen today for Superficial Radiation Therapy management.  A high frequency ultrasound image was acquired prior to evaluation today for three-dimensional evaluation of tumor volume and response to treatment (CPT® ). \\n\\nApproximately 6 weeks after finishing SRT, evaluation and management of SRT based on prescription and cumulative dose for reaction and response to radiation reveals some healing and response, however, a small raised lesion is seen on the surface of the skin, and the ultrasound imaging reveals residual lesion.  A boost of four fractions is recommended.
Total Number Of Fractions: 25
Energy (Include Units): 100kV
Simple Simulation Preamble Text Will Be Included With Simple Simulations (.......... Indications): Simple simulation was performed today for the following reasons:
Information: Selecting Yes will display possible errors in your note based on the variables you have selected. This validation is only offered as a suggestion for you. PLEASE NOTE THAT THE VALIDATION TEXT WILL BE REMOVED WHEN YOU FINALIZE YOUR NOTE. IF YOU WANT TO FAX A PRELIMINARY NOTE YOU WILL NEED TO TOGGLE THIS TO 'NO' IF YOU DO NOT WANT IT IN YOUR FAXED NOTE.
Daily Fractionated Dose (Optional- Include Units): 230.01cGy
Total Number Of Fractions Rx 4: 15
Custom Shielding Afterword Text Will Not Be Included With Simple Simulations (X X Y Cm............): port to correlate with the lesion size, including treatment margin. The custom lead shield is adequate to accommodate the appropriate applicator and provide adequate shielding around the treatment site. Additional shielding (as noted below) is used to protect sensitive, normal tissues.
Day Of The Week Treatment Administered: Wednesday
Total Number Of Fractions Rx 2: 4
Prescription Used: 2
Pathology Override (Pathology Will Render As Diagnosis Name If Left Blank): Squamous Cell Carcinoma-Invasive
Simple Simulation Afterword Text Will Be Included With Simple Simulations (Indications............): The patient had a complete consultation regarding all applicable modalities for the treatment of their lesion and based on a variety of factors including the type of lesion, size, and location, the relevant medical history as well as local tissue factors, the functional status of the individual, the ability to perform necessary postoperative wound instructions and the need for simultaneous treatments as well as overall wound healing status, it was determined that the patient would begin radiation therapy treatment for skin cancer.  A full simulation and treatment device design was performed including the determination and formulation of appropriate simple and complex devices including lead shield of 2.286 mm thickness to form molded customized shielding to specifically correlate with the lesion size including treatment margin.  The custom lead shield is adequate to accommodate the appropriate applicator and provide adequate shielding around the treatment site.  The specific field applicator, shields, and devices both simple and complex as well as the specific patient setup is outlined below.  The patient was given a full consent for superficial radiation to both verbally and in writing and the full determination of patient's eligibility for treatment and selection is outlined on the patient eligibility and treatment selection form.  The specific superficial radiotherapy prescription was determined and was documented on the superficial radiotherapy prescription form.  A treatment calculation was also performed and documented on the treatment calculation form.  Based on the prescription, the patient was scheduled for a series of fractional treatments.
Total Dose (Optional-Please Include Units): 5750.25cGy
Total Dose (Optional-Please Include Units) Rx 2: 893.64
Detail Level: Detailed
Functional Status: 0 (fully active)
Assessment: Appropriate reaction
Treatment Device Design After Initial Simulation Justification (Will Render If Bill For Treatment Devices = Yes): The patient is status post radiation simulation and is evaluated as to the use of additional devices for shielding and placement for radiation therapy.
Patient Positioning: Supine
Depth (Optional-Please Include Units): 1.98

## 2021-05-12 NOTE — PROCEDURE: TREATMENT REGIMEN
Plan: This patient has been treated today with image guided superficial radiation therapy for non-melanoma skin cancer.  Written informed consent has been previously obtained from this patient for this treatment. This consent is documented in the patient’s chart. The patient gave verbal consent to continue treatment today.\\nThe patient was treated with a specific radiation dose and setup as prescribed by the provider listed on this visit note. A Radiation Therapist performed administration of radiation under supervision of provider. The treatment parameters and cumulative dose are indicated above.\\nPrior to administering the radiation, the patient underwent a verification therapeutic radiology simulation-aided field setting defining relevant normal and abnormal target anatomy and acquiring images with high frequency ultrasound in addition to data necessary developing optimal radiation treatment process for the patient. This process includes verification of the treatment port(s) and proper treatment positioning. All treatment ports were photographed within electronic medical record. The patient’s customized lead blocking along with gross tumor volume and margin was confirmed. Considering superficial radiotherapy is clinical in setup, this requires physician and radiation therapist to clarify location interest being treated against initial images, pathology and patient anatomy. Care was taken ensuring fields treated were geometrically accurate and properly positioned using therapeutic radiology simulation-aided field setting verification per fraction. This process is also utilized to determine if any prescription or setup changes are necessary. These steps are therefore medically necessary ensuring safe and effective administration of radiation. Ongoing therapeutic radiology simulation-aided field setting verification is ordered throughout course of therapy.\\nA high frequency ultrasound image was acquired today for two-dimensional evaluation of the tumor volume and response to treatment, in addition to geometric accuracy of field placement. US depth is 1.68mm, which is -0.18mm in difference from previous imaging. The field placement and ultrasound imaging, per fraction, is separate and distinct from the initial simulation, and is an important task in providing safe administration of superficial radiation therapy. Physician evaluation of the ultrasound tumor depth will be ongoing throughout the course of treatment and is deemed medically necessary in order to ensure the efficacy of treatment and any necessary changes. Today’s image was evaluated for determination of continuation of treatment with the current plan or with necessary changes as appropriate. According to provider review of verification therapeutic radiology simulation-aided field setting and imaging, no change is required. Additionally, the use of ultrasound visualization and targeted assessment allows the patient to be able to see their cancer(s) progress, encouraging patient to complete and maintain compliance through full course of radiotherapy.\\nPer Dr. Kristopher Finnegan, continued ultrasound guidance and therapeutic radiology simulation-aided field setting verification per fraction is required for field placement, measurement of tumor depth, progress and acute effect monitoring.
Detail Level: Zone

## 2021-05-13 ENCOUNTER — APPOINTMENT (OUTPATIENT)
Dept: URBAN - METROPOLITAN AREA CLINIC 273 | Age: 76
Setting detail: DERMATOLOGY
End: 2021-05-13

## 2021-05-13 PROBLEM — C44.729 SQUAMOUS CELL CARCINOMA OF SKIN OF LEFT LOWER LIMB, INCLUDING HIP: Status: ACTIVE | Noted: 2021-05-13

## 2021-05-13 PROCEDURE — OTHER SUPERFICIAL RADIATION TREATMENT: OTHER

## 2021-05-13 PROCEDURE — 77401 RADIATION TX DELIVERY SUPFC: CPT

## 2021-05-13 PROCEDURE — G6001 ECHO GUIDANCE RADIOTHERAPY: HCPCS

## 2021-05-13 PROCEDURE — 77280 THER RAD SIMULAJ FIELD SMPL: CPT

## 2021-05-13 PROCEDURE — OTHER TREATMENT REGIMEN: OTHER

## 2021-05-13 NOTE — PROCEDURE: TREATMENT REGIMEN
Detail Level: Zone
Plan: This patient has been treated today with image guided superficial radiation therapy for non-melanoma skin cancer.  Written informed consent has been previously obtained from this patient for this treatment. This consent is documented in the patient’s chart. The patient gave verbal consent to continue treatment today.\\nThe patient was treated with a specific radiation dose and setup as prescribed by the provider listed on this visit note. A Radiation Therapist performed administration of radiation under supervision of provider. The treatment parameters and cumulative dose are indicated above.\\nPrior to administering the radiation, the patient underwent a verification therapeutic radiology simulation-aided field setting defining relevant normal and abnormal target anatomy and acquiring images with high frequency ultrasound in addition to data necessary developing optimal radiation treatment process for the patient. This process includes verification of the treatment port(s) and proper treatment positioning. All treatment ports were photographed within electronic medical record. The patient’s customized lead blocking along with gross tumor volume and margin was confirmed. Considering superficial radiotherapy is clinical in setup, this requires physician and radiation therapist to clarify location interest being treated against initial images, pathology and patient anatomy. Care was taken ensuring fields treated were geometrically accurate and properly positioned using therapeutic radiology simulation-aided field setting verification per fraction. This process is also utilized to determine if any prescription or setup changes are necessary. These steps are therefore medically necessary ensuring safe and effective administration of radiation. Ongoing therapeutic radiology simulation-aided field setting verification is ordered throughout course of therapy.\\nA high frequency ultrasound image was acquired today for two-dimensional evaluation of the tumor volume and response to treatment, in addition to geometric accuracy of field placement. US depth is 1.80mm, which is +0.12mm in difference from previous imaging. The field placement and ultrasound imaging, per fraction, is separate and distinct from the initial simulation, and is an important task in providing safe administration of superficial radiation therapy. Physician evaluation of the ultrasound tumor depth will be ongoing throughout the course of treatment and is deemed medically necessary in order to ensure the efficacy of treatment and any necessary changes. Today’s image was evaluated for determination of continuation of treatment with the current plan or with necessary changes as appropriate. According to provider review of verification therapeutic radiology simulation-aided field setting and imaging, no change is required. Additionally, the use of ultrasound visualization and targeted assessment allows the patient to be able to see their cancer(s) progress, encouraging patient to complete and maintain compliance through full course of radiotherapy.\\nPer Dr. Kristopher Finnegan, continued ultrasound guidance and therapeutic radiology simulation-aided field setting verification per fraction is required for field placement, measurement of tumor depth, progress and acute effect monitoring.\\Luis has completed prescribed course of SRT boost treatments.  She will return in 6 weeks for a follow up visit.

## 2021-05-13 NOTE — PROCEDURE: SUPERFICIAL RADIATION TREATMENT
Bill For Radiation Treatment: Yes
Total Number Of Fractions Rx 4: 15
Information: Selecting Yes will display possible errors in your note based on the variables you have selected. This validation is only offered as a suggestion for you. PLEASE NOTE THAT THE VALIDATION TEXT WILL BE REMOVED WHEN YOU FINALIZE YOUR NOTE. IF YOU WANT TO FAX A PRELIMINARY NOTE YOU WILL NEED TO TOGGLE THIS TO 'NO' IF YOU DO NOT WANT IT IN YOUR FAXED NOTE.
Intro Statement (Will Not Render If Left Blank): Per the request of Dr. Kristopher Finnegan, patient was seen today for Superficial Radiation Therapy management.  A high frequency ultrasound image was acquired prior to evaluation today for three-dimensional evaluation of tumor volume and response to treatment (CPT® ). \\n\\nApproximately 6 weeks after finishing SRT, evaluation and management of SRT based on prescription and cumulative dose for reaction and response to radiation reveals some healing and response, however, a small raised lesion is seen on the surface of the skin, and the ultrasound imaging reveals residual lesion.  A boost of four fractions is recommended.
Energy (Optional-Please Include Units) Rx 2: 100kV
Fractionation Number (Evaluation): 25
Treatment Margins In Cm: 0.8
Port Dimensions-Y Axis In Cm: 3
Energy (Optional-Please Include Units): 70kV
Include Rx 3 When Rendering Additional Prescriptions: No
Number Of Days Off Treatment: 1
Fractionation Number: 4
Total Dose (Optional-Please Include Units): 5750.25cGy
Day Of The Week Treatment Administered: Wednesday
Patient Positioning: Supine
Field Size (Applicator): 4.0 cm
Shielding Size (Optional- Include Units): 3.0x3.0
Additional Prescription Justification Text: If there is any interruption in treatment exceeding 5 days please see Decay and Dose Adjustment Calculation and complete treatment under Prescription 2, 3 or 4 as appropriate.
Simple Simulation Afterword Text Will Be Included With Simple Simulations (Indications............): The patient had a complete consultation regarding all applicable modalities for the treatment of their lesion and based on a variety of factors including the type of lesion, size, and location, the relevant medical history as well as local tissue factors, the functional status of the individual, the ability to perform necessary postoperative wound instructions and the need for simultaneous treatments as well as overall wound healing status, it was determined that the patient would begin radiation therapy treatment for skin cancer.  A full simulation and treatment device design was performed including the determination and formulation of appropriate simple and complex devices including lead shield of 2.286 mm thickness to form molded customized shielding to specifically correlate with the lesion size including treatment margin.  The custom lead shield is adequate to accommodate the appropriate applicator and provide adequate shielding around the treatment site.  The specific field applicator, shields, and devices both simple and complex as well as the specific patient setup is outlined below.  The patient was given a full consent for superficial radiation to both verbally and in writing and the full determination of patient's eligibility for treatment and selection is outlined on the patient eligibility and treatment selection form.  The specific superficial radiotherapy prescription was determined and was documented on the superficial radiotherapy prescription form.  A treatment calculation was also performed and documented on the treatment calculation form.  Based on the prescription, the patient was scheduled for a series of fractional treatments.
Custom Shielding Preamble Text Will Not Be Included With Simple Simulations (.......... X X Y Cm): A lead shield of 0.762 mm thickness is utilized to form a molded, custom shield with a
Daily Fractionated Dose (Optional- Include Units): 230.01cGy
Dose / Tx In Cgy (Optional): 223.41
Fractions / Week Rx 3: 5
Detail Level: Detailed
Treatment Time / Fractionation (Optional- Include Units) Rx 2: 0.33
Assessment: Appropriate reaction
Depth (Optional-Please Include Units): 1.98
Prescription Used: 2
Initial Radiation Treatment Planning (Will Render If Bill Simulation = Yes): The patient had a complete consultation regarding all applicable modalities for the treatment of their skin cancer and based on a variety of factors including the type of lesion, size, and location, the relevant medical history as well as local tissue factors, the functional status of the individual, the ability to perform necessary postoperative wound instructions and the need for simultaneous treatments as well as overall wound healing status, it was determined that the patient would begin radiation therapy treatment for skin cancer.  A full simulation and treatment device design was performed including the determination and formulation of appropriate simple and complex devices including lead shield of 0.762 mm thickness to form molded customized shielding to specifically correlate with the lesion size including treatment margin.  The custom lead shield is adequate to accommodate the appropriate applicator and provide adequate shielding around the treatment site.  The specific field applicator, shields, and devices both simple and complex as well as the specific patient setup is outlined below.  The patient was given a full consent for superficial radiation to both verbally and in writing and the full determination of patient's eligibility for treatment and selection is outlined on the patient eligibility and treatment selection form.  The specific superficial radiotherapy prescription was determined and was documented on the superficial radiotherapy prescription form.  A treatment calculation was also performed and documented on the treatment calculation form.  Based on the prescription, the patient was scheduled for a series of fractional treatments.
Cumulative Dose In Cgy (Optional): 6643.89
Functional Status: 0 (fully active)
Dimensions-X Axis In Cm: 1.5
Custom Shielding Afterword Text Will Not Be Included With Simple Simulations (X X Y Cm............): port to correlate with the lesion size, including treatment margin. The custom lead shield is adequate to accommodate the appropriate applicator and provide adequate shielding around the treatment site. Additional shielding (as noted below) is used to protect sensitive, normal tissues.
Daily Fractionated Dose (Optional- Include Units) Rx 2: 223.41cGy
Treatment Time / Fractionation (Optional- Include Units): 0.33min
Treatment Device Design After Initial Simulation Justification (Will Render If Bill For Treatment Devices = Yes): The patient is status post radiation simulation and is evaluated as to the use of additional devices for shielding and placement for radiation therapy.
Simple Simulation Preamble Text Will Be Included With Simple Simulations (.......... Indications): Simple simulation was performed today for the following reasons:
Pathology Override (Pathology Will Render As Diagnosis Name If Left Blank): Squamous Cell Carcinoma-Invasive
Comments: RTOG 0
Limb Positioning Or Elevation: Legs Extended
Total Dose (Optional-Please Include Units) Rx 2: 893.64
Time Dose Fractionation (Optional- Include Units If Applicable): 93

## 2021-05-19 ENCOUNTER — APPOINTMENT (OUTPATIENT)
Dept: URBAN - METROPOLITAN AREA CLINIC 273 | Age: 76
Setting detail: DERMATOLOGY
End: 2021-05-19

## 2021-05-19 DIAGNOSIS — L57.8 OTHER SKIN CHANGES DUE TO CHRONIC EXPOSURE TO NONIONIZING RADIATION: ICD-10-CM

## 2021-05-19 DIAGNOSIS — L57.0 ACTINIC KERATOSIS: ICD-10-CM

## 2021-05-19 PROCEDURE — OTHER COUNSELING: OTHER

## 2021-05-19 PROCEDURE — OTHER LIQUID NITROGEN: OTHER

## 2021-05-19 PROCEDURE — OTHER SEPARATE AND IDENTIFIABLE DOCUMENTATION: OTHER

## 2021-05-19 PROCEDURE — 17003 DESTRUCT PREMALG LES 2-14: CPT

## 2021-05-19 PROCEDURE — 99212 OFFICE O/P EST SF 10 MIN: CPT | Mod: 25

## 2021-05-19 PROCEDURE — 17000 DESTRUCT PREMALG LESION: CPT

## 2021-05-19 ASSESSMENT — LOCATION DETAILED DESCRIPTION DERM
LOCATION DETAILED: RIGHT FOREHEAD
LOCATION DETAILED: MIDDLE STERNUM
LOCATION DETAILED: LEFT INFERIOR FOREHEAD
LOCATION DETAILED: NASAL DORSUM
LOCATION DETAILED: RIGHT CENTRAL FRONTAL SCALP
LOCATION DETAILED: LEFT SUPERIOR FOREHEAD
LOCATION DETAILED: RIGHT INFERIOR FOREHEAD
LOCATION DETAILED: RIGHT LATERAL EYEBROW

## 2021-05-19 ASSESSMENT — LOCATION SIMPLE DESCRIPTION DERM
LOCATION SIMPLE: RIGHT EYEBROW
LOCATION SIMPLE: RIGHT FOREHEAD
LOCATION SIMPLE: LEFT FOREHEAD
LOCATION SIMPLE: CHEST
LOCATION SIMPLE: NOSE
LOCATION SIMPLE: RIGHT SCALP

## 2021-05-19 ASSESSMENT — LOCATION ZONE DERM
LOCATION ZONE: NOSE
LOCATION ZONE: SCALP
LOCATION ZONE: FACE
LOCATION ZONE: TRUNK

## 2021-05-19 NOTE — HPI: SKIN LESION
What Type Of Note Output Would You Prefer (Optional)?: Bullet Format
How Severe Is Your Skin Lesion?: mild
Has Your Skin Lesion Been Treated?: not been treated
Is This A New Presentation, Or A Follow-Up?: Skin Lesion
Additional History: Patient was told by Dr Finnegan to return for LN for nose lesion.  Has similar lesion on chest.

## 2021-05-19 NOTE — PROCEDURE: LIQUID NITROGEN
Duration Of Freeze Thaw-Cycle (Seconds): 2
Consent: The patient's consent was obtained including but not limited to risks of crusting, scabbing, blistering, scarring, darker or lighter pigmentary change, recurrence, incomplete removal and infection.
Render Post-Care Instructions In Note?: no
Number Of Freeze-Thaw Cycles: 1 freeze-thaw cycle
Post-Care Instructions: I reviewed with the patient in detail post-care instructions. Patient is to wear sunprotection, and avoid picking at any of the treated lesions. Pt may apply Vaseline to crusted or scabbing areas.
Detail Level: Simple

## 2021-05-22 ENCOUNTER — APPOINTMENT (OUTPATIENT)
Dept: URBAN - METROPOLITAN AREA CLINIC 273 | Age: 76
Setting detail: DERMATOLOGY
End: 2021-05-27

## 2021-05-22 PROCEDURE — 77336 RADIATION PHYSICS CONSULT: CPT

## 2021-06-09 ENCOUNTER — APPOINTMENT (OUTPATIENT)
Dept: URBAN - METROPOLITAN AREA CLINIC 273 | Age: 76
Setting detail: DERMATOLOGY
End: 2021-06-09

## 2021-06-09 DIAGNOSIS — L60.8 OTHER NAIL DISORDERS: ICD-10-CM

## 2021-06-09 DIAGNOSIS — L57.0 ACTINIC KERATOSIS: ICD-10-CM

## 2021-06-09 PROCEDURE — 17000 DESTRUCT PREMALG LESION: CPT

## 2021-06-09 PROCEDURE — OTHER PRESCRIPTION: OTHER

## 2021-06-09 PROCEDURE — 99213 OFFICE O/P EST LOW 20 MIN: CPT | Mod: 25

## 2021-06-09 PROCEDURE — OTHER COUNSELING: OTHER

## 2021-06-09 PROCEDURE — 17003 DESTRUCT PREMALG LES 2-14: CPT

## 2021-06-09 PROCEDURE — OTHER LIQUID NITROGEN: OTHER

## 2021-06-09 RX ORDER — TOBRAMYCIN OPHTHALMIC SOLUTION 3 MG/ML
SOLUTION/ DROPS OPHTHALMIC
Qty: 1 | Refills: 11 | Status: ERX | COMMUNITY
Start: 2021-06-09

## 2021-06-09 ASSESSMENT — LOCATION DETAILED DESCRIPTION DERM
LOCATION DETAILED: LEFT CENTRAL MALAR CHEEK
LOCATION DETAILED: NASAL DORSUM
LOCATION DETAILED: RIGHT FOREHEAD
LOCATION DETAILED: LEFT FOREHEAD
LOCATION DETAILED: LEFT INFERIOR CENTRAL MALAR CHEEK

## 2021-06-09 ASSESSMENT — LOCATION ZONE DERM
LOCATION ZONE: FACE
LOCATION ZONE: NOSE

## 2021-06-09 ASSESSMENT — LOCATION SIMPLE DESCRIPTION DERM
LOCATION SIMPLE: LEFT FOREHEAD
LOCATION SIMPLE: RIGHT FOREHEAD
LOCATION SIMPLE: NOSE
LOCATION SIMPLE: LEFT CHEEK

## 2021-06-09 NOTE — PROCEDURE: LIQUID NITROGEN
Render Note In Bullet Format When Appropriate: No
Detail Level: Simple
Consent: The patient's consent was obtained including but not limited to risks of crusting, scabbing, blistering, scarring, darker or lighter pigmentary change, recurrence, incomplete removal and infection.
Number Of Freeze-Thaw Cycles: 1 freeze-thaw cycle
Duration Of Freeze Thaw-Cycle (Seconds): 2
Post-Care Instructions: I reviewed with the patient in detail post-care instructions. Patient is to wear sunprotection, and avoid picking at any of the treated lesions. Pt may apply Vaseline to crusted or scabbing areas.

## 2021-06-24 ENCOUNTER — APPOINTMENT (OUTPATIENT)
Dept: URBAN - METROPOLITAN AREA CLINIC 273 | Age: 76
Setting detail: DERMATOLOGY
End: 2021-06-24

## 2021-06-24 PROBLEM — C44.729 SQUAMOUS CELL CARCINOMA OF SKIN OF LEFT LOWER LIMB, INCLUDING HIP: Status: ACTIVE | Noted: 2021-06-24

## 2021-06-24 PROCEDURE — OTHER SUPERFICIAL RADIATION TREATMENT: OTHER

## 2021-06-24 PROCEDURE — G6001 ECHO GUIDANCE RADIOTHERAPY: HCPCS

## 2021-06-24 PROCEDURE — 99213 OFFICE O/P EST LOW 20 MIN: CPT

## 2021-06-24 NOTE — PROCEDURE: SUPERFICIAL RADIATION TREATMENT
Simple Simulation Afterword Text Will Be Included With Simple Simulations (Indications............): The patient had a complete consultation regarding all applicable modalities for the treatment of their lesion and based on a variety of factors including the type of lesion, size, and location, the relevant medical history as well as local tissue factors, the functional status of the individual, the ability to perform necessary postoperative wound instructions and the need for simultaneous treatments as well as overall wound healing status, it was determined that the patient would begin radiation therapy treatment for skin cancer.  A full simulation and treatment device design was performed including the determination and formulation of appropriate simple and complex devices including lead shield of 2.286 mm thickness to form molded customized shielding to specifically correlate with the lesion size including treatment margin.  The custom lead shield is adequate to accommodate the appropriate applicator and provide adequate shielding around the treatment site.  The specific field applicator, shields, and devices both simple and complex as well as the specific patient setup is outlined below.  The patient was given a full consent for superficial radiation to both verbally and in writing and the full determination of patient's eligibility for treatment and selection is outlined on the patient eligibility and treatment selection form.  The specific superficial radiotherapy prescription was determined and was documented on the superficial radiotherapy prescription form.  A treatment calculation was also performed and documented on the treatment calculation form.  Based on the prescription, the patient was scheduled for a series of fractional treatments.

## 2021-06-24 NOTE — PROCEDURE: SUPERFICIAL RADIATION TREATMENT
Pathology Override (Pathology Will Render As Diagnosis Name If Left Blank): Squamous Cell Carcinoma-Invasive

## 2021-06-24 NOTE — PROCEDURE: SUPERFICIAL RADIATION TREATMENT
Initial Radiation Treatment Planning (Will Render If Bill Simulation = Yes): The patient had a complete consultation regarding all applicable modalities for the treatment of their skin cancer and based on a variety of factors including the type of lesion, size, and location, the relevant medical history as well as local tissue factors, the functional status of the individual, the ability to perform necessary postoperative wound instructions and the need for simultaneous treatments as well as overall wound healing status, it was determined that the patient would begin radiation therapy treatment for skin cancer.  A full simulation and treatment device design was performed including the determination and formulation of appropriate simple and complex devices including lead shield of 0.762 mm thickness to form molded customized shielding to specifically correlate with the lesion size including treatment margin.  The custom lead shield is adequate to accommodate the appropriate applicator and provide adequate shielding around the treatment site.  The specific field applicator, shields, and devices both simple and complex as well as the specific patient setup is outlined below.  The patient was given a full consent for superficial radiation to both verbally and in writing and the full determination of patient's eligibility for treatment and selection is outlined on the patient eligibility and treatment selection form.  The specific superficial radiotherapy prescription was determined and was documented on the superficial radiotherapy prescription form.  A treatment calculation was also performed and documented on the treatment calculation form.  Based on the prescription, the patient was scheduled for a series of fractional treatments.

## 2021-06-24 NOTE — PROCEDURE: SUPERFICIAL RADIATION TREATMENT
Intro Statement (Will Not Render If Left Blank): Per the request of Dr. Kristopher Finnegan, patient was seen today for a 6 week follow up after a 4 fraction boost of Superficial Radiation Therapy (CPT® 42319). Physician evaluation of the ultrasound tumor depth was ongoing through course of treatment and is deemed medically necessary ensuring efficacy of treatment. All appropriate custom blocking and treatment parameters verified by radiation therapist according to initial simulation. A high frequency ultrasound image was acquired today for three-dimensional evaluation of tumor volume and response to treatment, in addition, geometric accuracy of field placement (CPT® ). Today’s image was evaluated, lesion not detected on US. Objectively, the treated radiation area was evaluated with regards to erythema, atrophy, scale, crusting, erosion, ulceration, edema, purpura, tenderness, warmth, drainage, and any other findings. Patient to follow up for routine visits. Intro Statement (Will Not Render If Left Blank): Per the request of Dr. Kristopher Finnegan, patient was seen today for a 6 week follow up after a 4 fraction boost of Superficial Radiation Therapy (CPT® 28099). Physician evaluation of the ultrasound tumor depth was ongoing through course of treatment and is deemed medically necessary ensuring efficacy of treatment. All appropriate custom blocking and treatment parameters verified by radiation therapist according to initial simulation. A high frequency ultrasound image was acquired today for three-dimensional evaluation of tumor volume and response to treatment, in addition, geometric accuracy of field placement (CPT® ). Today’s image was evaluated, lesion not detected on US. Objectively, the treated radiation area was evaluated with regards to erythema, atrophy, scale, crusting, erosion, ulceration, edema, purpura, tenderness, warmth, drainage, and any other findings. Patient to follow up for routine visits.

## 2021-11-11 ENCOUNTER — APPOINTMENT (OUTPATIENT)
Dept: URBAN - METROPOLITAN AREA CLINIC 273 | Age: 76
Setting detail: DERMATOLOGY
End: 2021-11-11

## 2021-11-11 DIAGNOSIS — L60.8 OTHER NAIL DISORDERS: ICD-10-CM

## 2021-11-11 PROCEDURE — OTHER COUNSELING: OTHER

## 2021-11-11 PROCEDURE — OTHER PRESCRIPTION: OTHER

## 2021-11-11 PROCEDURE — 99213 OFFICE O/P EST LOW 20 MIN: CPT

## 2021-11-11 RX ORDER — TOBRAMYCIN OPHTHALMIC SOLUTION 3 MG/ML
SOLUTION/ DROPS OPHTHALMIC
Qty: 5 | Refills: 11 | Status: ERX | COMMUNITY
Start: 2021-11-11

## 2021-11-11 ASSESSMENT — LOCATION SIMPLE DESCRIPTION DERM: LOCATION SIMPLE: LEFT THUMBNAIL

## 2021-11-11 ASSESSMENT — LOCATION ZONE DERM: LOCATION ZONE: FINGERNAIL

## 2021-11-11 ASSESSMENT — LOCATION DETAILED DESCRIPTION DERM: LOCATION DETAILED: LEFT THUMBNAIL

## 2022-08-22 ENCOUNTER — APPOINTMENT (OUTPATIENT)
Dept: URBAN - METROPOLITAN AREA CLINIC 273 | Age: 77
Setting detail: DERMATOLOGY
End: 2022-08-22

## 2022-08-22 DIAGNOSIS — L60.8 OTHER NAIL DISORDERS: ICD-10-CM

## 2022-08-22 DIAGNOSIS — L57.0 ACTINIC KERATOSIS: ICD-10-CM

## 2022-08-22 DIAGNOSIS — L28.1 PRURIGO NODULARIS: ICD-10-CM

## 2022-08-22 PROCEDURE — 11900 INJECT SKIN LESIONS </W 7: CPT | Mod: 59

## 2022-08-22 PROCEDURE — 17000 DESTRUCT PREMALG LESION: CPT

## 2022-08-22 PROCEDURE — OTHER PRESCRIPTION: OTHER

## 2022-08-22 PROCEDURE — OTHER MIPS QUALITY: OTHER

## 2022-08-22 PROCEDURE — OTHER LIQUID NITROGEN: OTHER

## 2022-08-22 PROCEDURE — OTHER INTRALESIONAL KENALOG: OTHER

## 2022-08-22 PROCEDURE — 99213 OFFICE O/P EST LOW 20 MIN: CPT | Mod: 25

## 2022-08-22 RX ORDER — CIPROFLOXACIN 500 MG/1
TABLET, FILM COATED ORAL BID
Qty: 28 | Refills: 0 | Status: ERX | COMMUNITY
Start: 2022-08-22

## 2022-08-22 ASSESSMENT — LOCATION ZONE DERM
LOCATION ZONE: TRUNK
LOCATION ZONE: FINGERNAIL
LOCATION ZONE: NOSE

## 2022-08-22 ASSESSMENT — LOCATION DETAILED DESCRIPTION DERM
LOCATION DETAILED: RIGHT THUMBNAIL
LOCATION DETAILED: GLUTEAL CLEFT
LOCATION DETAILED: NASAL DORSUM

## 2022-08-22 ASSESSMENT — LOCATION SIMPLE DESCRIPTION DERM
LOCATION SIMPLE: RIGHT THUMBNAIL
LOCATION SIMPLE: GLUTEAL CLEFT
LOCATION SIMPLE: NOSE

## 2022-08-22 NOTE — PROCEDURE: MIPS QUALITY
Quality 111:Pneumonia Vaccination Status For Older Adults: Pneumococcal vaccine administered on or after patient’s 60th birthday and before the end of the measurement period
Detail Level: Simple
Quality 226: Preventive Care And Screening: Tobacco Use: Screening And Cessation Intervention: Patient screened for tobacco use, is a smoker AND did not received Cessation Counseling for Medical Reasons within the Previous 12 Months
Quality 431: Preventive Care And Screening: Unhealthy Alcohol Use - Screening: Patient not identified as an unhealthy alcohol user when screened for unhealthy alcohol use using a systematic screening method
Quality 110: Preventive Care And Screening: Influenza Immunization: Influenza Immunization previously received during influenza season

## 2022-08-22 NOTE — PROCEDURE: LIQUID NITROGEN
Detail Level: Simple
Post-Care Instructions: I reviewed with the patient in detail post-care instructions. Patient is to wear sunprotection, and avoid picking at any of the treated lesions. Pt may apply Vaseline to crusted or scabbing areas.
Consent: The patient's consent was obtained including but not limited to risks of crusting, scabbing, blistering, scarring, darker or lighter pigmentary change, recurrence, incomplete removal and infection.
Application Tool (Optional): Cotton Tipped Applicator
Render Note In Bullet Format When Appropriate: No
Number Of Freeze-Thaw Cycles: 1 freeze-thaw cycle
Duration Of Freeze Thaw-Cycle (Seconds): 10
Show Applicator Variable?: Yes

## 2022-10-03 ENCOUNTER — APPOINTMENT (OUTPATIENT)
Dept: URBAN - METROPOLITAN AREA CLINIC 273 | Age: 77
Setting detail: DERMATOLOGY
End: 2022-10-03

## 2022-10-03 DIAGNOSIS — L60.8 OTHER NAIL DISORDERS: ICD-10-CM

## 2022-10-03 PROCEDURE — 99213 OFFICE O/P EST LOW 20 MIN: CPT

## 2022-10-03 PROCEDURE — OTHER COUNSELING: OTHER

## 2022-10-03 PROCEDURE — OTHER PRESCRIPTION: OTHER

## 2022-10-03 PROCEDURE — OTHER MIPS QUALITY: OTHER

## 2022-10-03 RX ORDER — TOBRAMYCIN 3 MG/ML
SOLUTION/ DROPS OPHTHALMIC
Qty: 5 | Refills: 0 | Status: ERX | COMMUNITY
Start: 2022-10-03

## 2022-10-03 ASSESSMENT — LOCATION SIMPLE DESCRIPTION DERM: LOCATION SIMPLE: RIGHT THUMBNAIL

## 2022-10-03 ASSESSMENT — LOCATION DETAILED DESCRIPTION DERM: LOCATION DETAILED: RIGHT THUMBNAIL

## 2022-10-03 ASSESSMENT — LOCATION ZONE DERM: LOCATION ZONE: FINGERNAIL

## 2022-10-03 NOTE — PROCEDURE: MIPS QUALITY
Quality 226: Preventive Care And Screening: Tobacco Use: Screening And Cessation Intervention: Patient screened for tobacco use, is a smoker AND did not received Cessation Counseling for Medical Reasons within the Previous 12 Months
Quality 110: Preventive Care And Screening: Influenza Immunization: Influenza Immunization previously received during influenza season
Detail Level: Simple
Quality 111:Pneumonia Vaccination Status For Older Adults: Pneumococcal vaccine administered on or after patient’s 60th birthday and before the end of the measurement period
Quality 431: Preventive Care And Screening: Unhealthy Alcohol Use - Screening: Patient not identified as an unhealthy alcohol user when screened for unhealthy alcohol use using a systematic screening method

## 2022-12-05 ENCOUNTER — APPOINTMENT (OUTPATIENT)
Dept: URBAN - METROPOLITAN AREA CLINIC 273 | Age: 77
Setting detail: DERMATOLOGY
End: 2022-12-05

## 2022-12-05 DIAGNOSIS — L57.0 ACTINIC KERATOSIS: ICD-10-CM

## 2022-12-05 DIAGNOSIS — L60.8 OTHER NAIL DISORDERS: ICD-10-CM

## 2022-12-05 PROCEDURE — 17003 DESTRUCT PREMALG LES 2-14: CPT

## 2022-12-05 PROCEDURE — 99213 OFFICE O/P EST LOW 20 MIN: CPT | Mod: 25

## 2022-12-05 PROCEDURE — OTHER PRESCRIPTION: OTHER

## 2022-12-05 PROCEDURE — OTHER COUNSELING: OTHER

## 2022-12-05 PROCEDURE — OTHER LIQUID NITROGEN: OTHER

## 2022-12-05 PROCEDURE — 17000 DESTRUCT PREMALG LESION: CPT

## 2022-12-05 RX ORDER — CLOTRIMAZOLE AND BETAMETHASONE DIPROPIONATE 10; .5 MG/G; MG/G
CREAM TOPICAL
Qty: 15 | Refills: 5 | Status: ERX | COMMUNITY
Start: 2022-12-05

## 2022-12-05 ASSESSMENT — LOCATION DETAILED DESCRIPTION DERM
LOCATION DETAILED: RIGHT MEDIAL SUPERIOR CHEST
LOCATION DETAILED: RIGHT THUMBNAIL
LOCATION DETAILED: STERNAL NOTCH

## 2022-12-05 ASSESSMENT — LOCATION SIMPLE DESCRIPTION DERM
LOCATION SIMPLE: RIGHT THUMBNAIL
LOCATION SIMPLE: CHEST

## 2022-12-05 ASSESSMENT — LOCATION ZONE DERM
LOCATION ZONE: FINGERNAIL
LOCATION ZONE: TRUNK

## 2022-12-05 NOTE — PROCEDURE: LIQUID NITROGEN
Post-Care Instructions: I reviewed with the patient in detail post-care instructions. Patient is to wear sunprotection, and avoid picking at any of the treated lesions. Pt may apply Vaseline to crusted or scabbing areas.
Render Note In Bullet Format When Appropriate: No
Number Of Freeze-Thaw Cycles: 1 freeze-thaw cycle
Duration Of Freeze Thaw-Cycle (Seconds): 15
Show Applicator Variable?: Yes
Application Tool (Optional): Cotton Tipped Applicator
Consent: The patient's consent was obtained including but not limited to risks of crusting, scabbing, blistering, scarring, darker or lighter pigmentary change, recurrence, incomplete removal and infection.
Detail Level: Simple

## 2024-04-24 ENCOUNTER — APPOINTMENT (OUTPATIENT)
Dept: URBAN - METROPOLITAN AREA CLINIC 306 | Age: 79
Setting detail: DERMATOLOGY
End: 2024-04-24

## 2024-04-24 DIAGNOSIS — L60.8 OTHER NAIL DISORDERS: ICD-10-CM

## 2024-04-24 PROCEDURE — 99213 OFFICE O/P EST LOW 20 MIN: CPT

## 2024-04-24 PROCEDURE — OTHER PRESCRIPTION: OTHER

## 2024-04-24 PROCEDURE — OTHER COUNSELING: OTHER

## 2024-04-24 RX ORDER — CIPROFLOXACIN 500 MG/1
TABLET, FILM COATED ORAL BID
Qty: 14 | Refills: 3 | Status: ERX | COMMUNITY
Start: 2024-04-24

## 2024-04-24 ASSESSMENT — LOCATION SIMPLE DESCRIPTION DERM: LOCATION SIMPLE: RIGHT THUMBNAIL

## 2024-04-24 ASSESSMENT — LOCATION ZONE DERM: LOCATION ZONE: FINGERNAIL

## 2024-04-24 ASSESSMENT — LOCATION DETAILED DESCRIPTION DERM: LOCATION DETAILED: RIGHT THUMBNAIL

## 2024-05-01 ENCOUNTER — APPOINTMENT (OUTPATIENT)
Dept: URBAN - METROPOLITAN AREA CLINIC 306 | Age: 79
Setting detail: DERMATOLOGY
End: 2024-05-01

## 2024-05-01 DIAGNOSIS — L60.8 OTHER NAIL DISORDERS: ICD-10-CM

## 2024-05-01 PROCEDURE — 99213 OFFICE O/P EST LOW 20 MIN: CPT | Mod: 25

## 2024-05-01 PROCEDURE — 11755 BIOPSY NAIL UNIT: CPT | Mod: F5

## 2024-05-01 PROCEDURE — OTHER PRESCRIPTION: OTHER

## 2024-05-01 PROCEDURE — OTHER NAIL UNIT BIOPSY: OTHER

## 2024-05-01 RX ORDER — TOBRAMYCIN OPHTHALMIC SOLUTION 3 MG/ML
SOLUTION/ DROPS OPHTHALMIC
Qty: 5 | Refills: 5 | Status: ERX | COMMUNITY
Start: 2024-05-01

## 2024-05-01 ASSESSMENT — LOCATION ZONE DERM: LOCATION ZONE: FINGERNAIL

## 2024-05-01 ASSESSMENT — LOCATION SIMPLE DESCRIPTION DERM: LOCATION SIMPLE: RIGHT THUMBNAIL

## 2024-05-01 ASSESSMENT — LOCATION DETAILED DESCRIPTION DERM: LOCATION DETAILED: RIGHT THUMBNAIL

## 2024-05-01 NOTE — PROCEDURE: NAIL UNIT BIOPSY
Detail Level: Detailed
Biopsy Technique: punch biopsy
Nail Avulsion: Yes- Complete
Biopsy Type: PAS
Size Of Lesion In Cm (Optional): 0
Shave Biopsy Method: scissors
Punch Size In Mm: 12
Anesthesia Method: Digital block
Anesthesia Type: 1% lidocaine with epinephrine
Anesthesia Volume In Cc: 0.5
Hemostasis: None
Lab: [id number]
Lab Facility: [id number]
Repair Type: None, Secondary Intention
Epidermal Sutures: 4-0 Ethilon
Wound Care: Petrolatum
Suture Removal: 7 days
Pre-Op Text: A tourniquet was applied to the proximal digit and then the site was prepped.
Nail Avulsion Text: The nail plate was undermined, starting at the distal nail fold,  the nail plate from the nail bed. After detaching the nail plate from the nail bed the nail plate was avulsed.
Partial Nail Avulsion Text: An incision was made in the nail plate. The nail plate was undermined, starting at the distal nail fold,  the nail plate from the nail bed. After detaching the nail plate from the nail bed the a portion of the nail plate was avulsed.
Nail Matrix Exploration Text: The nail matrix was exposed by making releasing incisions, with a 15 blade scalpel, in the proximal nail fold. The proximal nail fold was then retracted to expose the nail matrix. Following the procedure the nail fold was replaced and sutured.
Partial Removal Of Nail Plate Text: The nail plate was partially removed to expose the underlying lesion.
Partial Removal And Replacement Of Nail Plate Text: The nail plate was partially removed to expose the underlying lesion. The nail plate was then replaced at the end of the procedure.
Secondary Intention Text: Following the biopsy the site will heal with secondary intention.
Simple Repair Text: Following the biopsy the surgical site was closed primarily.
Nail Bed Repair Text: Following the biopsy the nail bed was repaired.
Reconstruction Of Nail Bed With Graft Text: Following the biopsy the nail bed was repaired with a skin graft.
Render Path Notes In Note?: No
Consent: Written consent was obtained and risks were reviewed including but not limited to scarring, infection, bleeding, scabbing, incomplete removal, nerve damage and allergy to anesthesia.
Post-Care Instructions: I reviewed with the patient in detail post-care instructions. Patient is to keep the biopsy site dry overnight, and then apply bacitracin twice daily until healed. Patient may apply hydrogen peroxide soaks to remove any crusting.
Notification Instructions: Patient will be notified of biopsy results. However, patient instructed to call the office if not contacted within 2 weeks.
Billing Type: Third-Party Bill

## 2024-05-06 ENCOUNTER — RX ONLY (RX ONLY)
Age: 79
End: 2024-05-06

## 2024-05-06 RX ORDER — KETOCONAZOLE 20 MG/G
CREAM TOPICAL
Qty: 60 | Refills: 11 | Status: ERX | COMMUNITY
Start: 2024-05-06

## 2024-06-03 ENCOUNTER — APPOINTMENT (OUTPATIENT)
Dept: URBAN - METROPOLITAN AREA CLINIC 306 | Age: 79
Setting detail: DERMATOLOGY
End: 2024-06-03

## 2024-06-03 DIAGNOSIS — L60.8 OTHER NAIL DISORDERS: ICD-10-CM

## 2024-06-03 DIAGNOSIS — L82.1 OTHER SEBORRHEIC KERATOSIS: ICD-10-CM

## 2024-06-03 PROCEDURE — OTHER PRESCRIPTION MEDICATION MANAGEMENT: OTHER

## 2024-06-03 PROCEDURE — OTHER ADDITIONAL NOTES: OTHER

## 2024-06-03 PROCEDURE — 99213 OFFICE O/P EST LOW 20 MIN: CPT

## 2024-06-03 PROCEDURE — OTHER REASSURANCE: OTHER

## 2024-06-03 ASSESSMENT — LOCATION SIMPLE DESCRIPTION DERM
LOCATION SIMPLE: RIGHT THUMBNAIL
LOCATION SIMPLE: RIGHT FOREARM
LOCATION SIMPLE: LEFT FOREARM

## 2024-06-03 ASSESSMENT — LOCATION ZONE DERM
LOCATION ZONE: FINGERNAIL
LOCATION ZONE: ARM

## 2024-06-03 ASSESSMENT — LOCATION DETAILED DESCRIPTION DERM
LOCATION DETAILED: LEFT DISTAL RADIAL DORSAL FOREARM
LOCATION DETAILED: RIGHT DISTAL DORSAL FOREARM
LOCATION DETAILED: RIGHT THUMBNAIL

## 2024-06-03 NOTE — PROCEDURE: PRESCRIPTION MEDICATION MANAGEMENT
Continue Regimen: Ketoconazole, tobramycin for one more month
Detail Level: Zone
Render In Strict Bullet Format?: No

## 2024-06-03 NOTE — PROCEDURE: ADDITIONAL NOTES
Render Risk Assessment In Note?: yes
Detail Level: Simple
Additional Notes: New nail growth observed

## 2024-11-30 NOTE — PROCEDURE: SUPERFICIAL RADIATION TREATMENT
Refill Decision Note   Meena Son  is requesting a refill authorization.  Brief Assessment and Rationale for Refill:  Approve     Medication Therapy Plan:        Comments:     Note composed:10:31 AM 11/30/2024           
Additional Prescription Justification Text: If there is any interruption in treatment exceeding 5 days please see Decay and Dose Adjustment Calculation and complete treatment under Prescription 2, 3 or 4 as appropriate.
No care due was identified.  [facility] Embedded Care Due Messages. Reference number: [id number].   11/30/2024 3:33:20 AM CST  
Total Number Of Fractions: 25
Shielding Size (Optional- Include Units): 3.0x3.0
Simple Simulation Preamble Text Will Be Included With Simple Simulations (.......... Indications): Simple simulation was performed today for the following reasons:
Dimensions-Y Axis In Cm: 1.5
Fractions / Week Rx 4: 5
Bill And Render Text From Evaluation And Management Tab (Will Bill 71208): Yes
Arley For Simulation Without Treatment Device Design (Simple Simulation): No
Treatment Time In Min (Optional): 0.33
Number Of Treatment Days: 1
Total Number Of Fractions Rx 2: 3
Comments: RTOG 0
Custom Shielding Afterword Text Will Not Be Included With Simple Simulations (X X Y Cm............): port to correlate with the lesion size, including treatment margin. The custom lead shield is adequate to accommodate the appropriate applicator and provide adequate shielding around the treatment site. Additional shielding (as noted below) is used to protect sensitive, normal tissues.
Energy (Optional-Please Include Units): 70kV
Treatment Margins In Cm: 0.8
Total Dose (Optional-Please Include Units): 5750.25cGy
Total Number Of Fractions Rx 3: 15
Dose / Tx In Cgy (Optional): 230.01
Intro Statement (Will Not Render If Left Blank): Per the request of Dr. Kristopher Finnegan, patient was seen today for Superficial Radiation Therapy management.  A high frequency ultrasound image was acquired prior to evaluation today for three-dimensional evaluation of tumor volume and response to treatment (CPT® ). \\n\\nApproximately 6 weeks after finishing SRT, evaluation and management of SRT based on prescription and cumulative dose for reaction and response to radiation reveals some healing and response, however, a small raised lesion is seen on the surface of the skin, and the ultrasound imaging reveals residual lesion.  A boost of four fractions is recommended.
Field Size (Applicator): 4.0 cm
Time Dose Fractionation (Optional- Include Units If Applicable): 93
Pathology Override (Pathology Will Render As Diagnosis Name If Left Blank): Squamous Cell Carcinoma-Invasive
Detail Level: Detailed
Cumulative Dose In Cgy (Optional): 5750.25
Daily Fractionated Dose (Optional- Include Units): 230.01cGy
Simple Simulation Afterword Text Will Be Included With Simple Simulations (Indications............): The patient had a complete consultation regarding all applicable modalities for the treatment of their lesion and based on a variety of factors including the type of lesion, size, and location, the relevant medical history as well as local tissue factors, the functional status of the individual, the ability to perform necessary postoperative wound instructions and the need for simultaneous treatments as well as overall wound healing status, it was determined that the patient would begin radiation therapy treatment for skin cancer.  A full simulation and treatment device design was performed including the determination and formulation of appropriate simple and complex devices including lead shield of 2.286 mm thickness to form molded customized shielding to specifically correlate with the lesion size including treatment margin.  The custom lead shield is adequate to accommodate the appropriate applicator and provide adequate shielding around the treatment site.  The specific field applicator, shields, and devices both simple and complex as well as the specific patient setup is outlined below.  The patient was given a full consent for superficial radiation to both verbally and in writing and the full determination of patient's eligibility for treatment and selection is outlined on the patient eligibility and treatment selection form.  The specific superficial radiotherapy prescription was determined and was documented on the superficial radiotherapy prescription form.  A treatment calculation was also performed and documented on the treatment calculation form.  Based on the prescription, the patient was scheduled for a series of fractional treatments.
Limb Positioning Or Elevation: Legs Extended
Assessment: Appropriate reaction
Information: Selecting Yes will display possible errors in your note based on the variables you have selected. This validation is only offered as a suggestion for you. PLEASE NOTE THAT THE VALIDATION TEXT WILL BE REMOVED WHEN YOU FINALIZE YOUR NOTE. IF YOU WANT TO FAX A PRELIMINARY NOTE YOU WILL NEED TO TOGGLE THIS TO 'NO' IF YOU DO NOT WANT IT IN YOUR FAXED NOTE.
Day Of The Week Treatment Administered: Thursday
Treatment Time / Fractionation (Optional- Include Units): 0.33min
Initial Radiation Treatment Planning (Will Render If Bill Simulation = Yes): The patient had a complete consultation regarding all applicable modalities for the treatment of their skin cancer and based on a variety of factors including the type of lesion, size, and location, the relevant medical history as well as local tissue factors, the functional status of the individual, the ability to perform necessary postoperative wound instructions and the need for simultaneous treatments as well as overall wound healing status, it was determined that the patient would begin radiation therapy treatment for skin cancer.  A full simulation and treatment device design was performed including the determination and formulation of appropriate simple and complex devices including lead shield of 0.762 mm thickness to form molded customized shielding to specifically correlate with the lesion size including treatment margin.  The custom lead shield is adequate to accommodate the appropriate applicator and provide adequate shielding around the treatment site.  The specific field applicator, shields, and devices both simple and complex as well as the specific patient setup is outlined below.  The patient was given a full consent for superficial radiation to both verbally and in writing and the full determination of patient's eligibility for treatment and selection is outlined on the patient eligibility and treatment selection form.  The specific superficial radiotherapy prescription was determined and was documented on the superficial radiotherapy prescription form.  A treatment calculation was also performed and documented on the treatment calculation form.  Based on the prescription, the patient was scheduled for a series of fractional treatments.
Patient Positioning: Supine
Treatment Device Design After Initial Simulation Justification (Will Render If Bill For Treatment Devices = Yes): The patient is status post radiation simulation and is evaluated as to the use of additional devices for shielding and placement for radiation therapy.
Functional Status: 0 (fully active)
Custom Shielding Preamble Text Will Not Be Included With Simple Simulations (.......... X X Y Cm): A lead shield of 0.762 mm thickness is utilized to form a molded, custom shield with a
Depth (Optional-Please Include Units): 1.98